# Patient Record
Sex: FEMALE | Race: WHITE | NOT HISPANIC OR LATINO | Employment: OTHER | URBAN - METROPOLITAN AREA
[De-identification: names, ages, dates, MRNs, and addresses within clinical notes are randomized per-mention and may not be internally consistent; named-entity substitution may affect disease eponyms.]

---

## 2019-05-09 PROCEDURE — 88321 CONSLTJ&REPRT SLD PREP ELSWR: CPT | Performed by: SURGERY

## 2019-06-19 ENCOUNTER — DOCUMENTATION (OUTPATIENT)
Dept: ONCOLOGY | Facility: CLINIC | Age: 69
End: 2019-06-19

## 2019-06-21 ENCOUNTER — LAB REQUISITION (OUTPATIENT)
Dept: LAB | Facility: HOSPITAL | Age: 69
End: 2019-06-21

## 2019-06-21 DIAGNOSIS — D05.82 OTHER SPECIFIED TYPE OF CARCINOMA IN SITU OF LEFT BREAST: ICD-10-CM

## 2019-06-26 ENCOUNTER — TRANSCRIBE ORDERS (OUTPATIENT)
Dept: ADMINISTRATIVE | Facility: HOSPITAL | Age: 69
End: 2019-06-26

## 2019-06-26 DIAGNOSIS — C50.412 MALIGNANT NEOPLASM OF UPPER-OUTER QUADRANT OF LEFT FEMALE BREAST, UNSPECIFIED ESTROGEN RECEPTOR STATUS (HCC): Primary | ICD-10-CM

## 2019-06-28 LAB
CYTO UR: NORMAL
LAB AP CASE REPORT: NORMAL
PATH REPORT.FINAL DX SPEC: NORMAL
PATH REPORT.GROSS SPEC: NORMAL

## 2019-07-01 ENCOUNTER — APPOINTMENT (OUTPATIENT)
Dept: PREADMISSION TESTING | Facility: HOSPITAL | Age: 69
End: 2019-07-01

## 2019-07-01 VITALS — WEIGHT: 133.38 LBS | HEIGHT: 61 IN | BODY MASS INDEX: 25.18 KG/M2

## 2019-07-01 LAB
ALBUMIN SERPL-MCNC: 4.6 G/DL (ref 3.5–5.2)
ALBUMIN/GLOB SERPL: 1.8 G/DL
ALP SERPL-CCNC: 67 U/L (ref 39–117)
ALT SERPL W P-5'-P-CCNC: 10 U/L (ref 1–33)
ANION GAP SERPL CALCULATED.3IONS-SCNC: 14 MMOL/L (ref 5–15)
ARTERIAL PATENCY WRIST A: ABNORMAL
AST SERPL-CCNC: 14 U/L (ref 1–32)
ATMOSPHERIC PRESS: ABNORMAL MMHG
BASE EXCESS BLDA CALC-SCNC: 2.8 MMOL/L (ref 0–2)
BDY SITE: ABNORMAL
BILIRUB SERPL-MCNC: 0.4 MG/DL (ref 0.2–1.2)
BODY TEMPERATURE: 37 C
BUN BLD-MCNC: 7 MG/DL (ref 8–23)
BUN/CREAT SERPL: 8.6 (ref 7–25)
CALCIUM SPEC-SCNC: 9.3 MG/DL (ref 8.6–10.5)
CHLORIDE SERPL-SCNC: 103 MMOL/L (ref 98–107)
CO2 BLDA-SCNC: 27.4 MMOL/L (ref 23–27)
CO2 SERPL-SCNC: 27 MMOL/L (ref 22–29)
COHGB MFR BLD: 3.1 % (ref 0–2)
CREAT BLD-MCNC: 0.81 MG/DL (ref 0.57–1)
DEPRECATED RDW RBC AUTO: 49.5 FL (ref 37–54)
EPAP: 0
ERYTHROCYTE [DISTWIDTH] IN BLOOD BY AUTOMATED COUNT: 14.5 % (ref 12.3–15.4)
GFR SERPL CREATININE-BSD FRML MDRD: 70 ML/MIN/1.73
GLOBULIN UR ELPH-MCNC: 2.6 GM/DL
GLUCOSE BLD-MCNC: 213 MG/DL (ref 65–99)
HCO3 BLDA-SCNC: 26.3 MMOL/L (ref 20–26)
HCT VFR BLD AUTO: 45.4 % (ref 34–46.6)
HCT VFR BLD CALC: 44.5 %
HGB BLD-MCNC: 14.5 G/DL (ref 12–15.9)
HGB BLDA-MCNC: 14.5 G/DL (ref 14–18)
HOROWITZ INDEX BLD+IHG-RTO: 21 %
IPAP: 0
MCH RBC QN AUTO: 29.6 PG (ref 26.6–33)
MCHC RBC AUTO-ENTMCNC: 31.9 G/DL (ref 31.5–35.7)
MCV RBC AUTO: 92.7 FL (ref 79–97)
METHGB BLD QL: 0.8 % (ref 0–1.5)
MODALITY: ABNORMAL
NOTE: ABNORMAL
OXYHGB MFR BLDV: 92.4 % (ref 94–99)
PAW @ PEAK INSP FLOW SETTING VENT: 0 CMH2O
PCO2 BLDA: 35.9 MM HG (ref 35–45)
PCO2 TEMP ADJ BLD: 35.9 MM HG (ref 35–45)
PH BLDA: 7.47 PH UNITS (ref 7.35–7.45)
PH, TEMP CORRECTED: 7.47 PH UNITS
PLATELET # BLD AUTO: 195 10*3/MM3 (ref 140–450)
PMV BLD AUTO: 12.2 FL (ref 6–12)
PO2 BLDA: 73.2 MM HG (ref 83–108)
PO2 TEMP ADJ BLD: 73.2 MM HG (ref 83–108)
POTASSIUM BLD-SCNC: 4 MMOL/L (ref 3.5–5.2)
PROT SERPL-MCNC: 7.2 G/DL (ref 6–8.5)
RBC # BLD AUTO: 4.9 10*6/MM3 (ref 3.77–5.28)
SODIUM BLD-SCNC: 144 MMOL/L (ref 136–145)
TOTAL RATE: 0 BREATHS/MINUTE
WBC NRBC COR # BLD: 6.28 10*3/MM3 (ref 3.4–10.8)

## 2019-07-01 PROCEDURE — 80053 COMPREHEN METABOLIC PANEL: CPT | Performed by: SURGERY

## 2019-07-01 PROCEDURE — 82805 BLOOD GASES W/O2 SATURATION: CPT | Performed by: SURGERY

## 2019-07-01 PROCEDURE — 36415 COLL VENOUS BLD VENIPUNCTURE: CPT

## 2019-07-01 PROCEDURE — 93010 ELECTROCARDIOGRAM REPORT: CPT | Performed by: INTERNAL MEDICINE

## 2019-07-01 PROCEDURE — 85027 COMPLETE CBC AUTOMATED: CPT | Performed by: SURGERY

## 2019-07-01 PROCEDURE — 36600 WITHDRAWAL OF ARTERIAL BLOOD: CPT | Performed by: SURGERY

## 2019-07-01 PROCEDURE — 93005 ELECTROCARDIOGRAM TRACING: CPT

## 2019-07-01 RX ORDER — OXYMETAZOLINE HYDROCHLORIDE 0.05 G/100ML
2 SPRAY NASAL AS NEEDED
COMMUNITY

## 2019-07-01 NOTE — PAT
Patient to apply Chlorhexadine wipes  to surgical area (as instructed) the night before procedure and the AM of procedure. Wipes provided.    Patient instructed to drink 20 ounces (or until full) of Gatorade or 20 ounces of G2 (if diabetic) and complete 1 hour before arrival time for procedure (NO RED Gatorade or G2)    Patient verbalized understanding.    Per Anesthesia Request, patient instructed not to take their ACE/ARB medications on the AM of surgery.

## 2019-07-02 ENCOUNTER — HOSPITAL ENCOUNTER (OUTPATIENT)
Dept: NUCLEAR MEDICINE | Facility: HOSPITAL | Age: 69
Discharge: HOME | End: 2019-07-02

## 2019-07-02 ENCOUNTER — ANESTHESIA (OUTPATIENT)
Dept: PERIOP | Facility: HOSPITAL | Age: 69
End: 2019-07-02

## 2019-07-02 ENCOUNTER — ANESTHESIA EVENT (OUTPATIENT)
Dept: PERIOP | Facility: HOSPITAL | Age: 69
End: 2019-07-02

## 2019-07-02 ENCOUNTER — HOSPITAL ENCOUNTER (OUTPATIENT)
Facility: HOSPITAL | Age: 69
Discharge: HOME | End: 2019-07-03
Attending: SURGERY | Admitting: SURGERY

## 2019-07-02 DIAGNOSIS — C50.412 MALIGNANT NEOPLASM OF UPPER-OUTER QUADRANT OF LEFT FEMALE BREAST (HCC): ICD-10-CM

## 2019-07-02 DIAGNOSIS — C50.412 MALIGNANT NEOPLASM OF UPPER-OUTER QUADRANT OF LEFT FEMALE BREAST, UNSPECIFIED ESTROGEN RECEPTOR STATUS (HCC): ICD-10-CM

## 2019-07-02 PROCEDURE — 25010000003 CEFAZOLIN IN DEXTROSE 2-4 GM/100ML-% SOLUTION: Performed by: SURGERY

## 2019-07-02 PROCEDURE — 38792 RA TRACER ID OF SENTINL NODE: CPT

## 2019-07-02 PROCEDURE — 63710000001 CLONAZEPAM 1 MG TABLET: Performed by: SURGERY

## 2019-07-02 PROCEDURE — A9270 NON-COVERED ITEM OR SERVICE: HCPCS | Performed by: SURGERY

## 2019-07-02 PROCEDURE — 88342 IMHCHEM/IMCYTCHM 1ST ANTB: CPT | Performed by: SURGERY

## 2019-07-02 PROCEDURE — 63710000001 ATORVASTATIN 10 MG TABLET: Performed by: SURGERY

## 2019-07-02 PROCEDURE — 88307 TISSUE EXAM BY PATHOLOGIST: CPT | Performed by: SURGERY

## 2019-07-02 PROCEDURE — 88360 TUMOR IMMUNOHISTOCHEM/MANUAL: CPT | Performed by: SURGERY

## 2019-07-02 PROCEDURE — 63710000001 CLONAZEPAM 0.5 MG TABLET: Performed by: SURGERY

## 2019-07-02 PROCEDURE — 88331 PATH CONSLTJ SURG 1 BLK 1SPC: CPT | Performed by: PATHOLOGY

## 2019-07-02 PROCEDURE — 25010000002 PROPOFOL 10 MG/ML EMULSION: Performed by: NURSE ANESTHETIST, CERTIFIED REGISTERED

## 2019-07-02 PROCEDURE — 0 TECHNETIUM FILTERED SULFUR COLLOID: Performed by: SURGERY

## 2019-07-02 PROCEDURE — 25010000002 DEXAMETHASONE PER 1 MG: Performed by: NURSE ANESTHETIST, CERTIFIED REGISTERED

## 2019-07-02 PROCEDURE — 88341 IMHCHEM/IMCYTCHM EA ADD ANTB: CPT | Performed by: SURGERY

## 2019-07-02 PROCEDURE — 63710000001 ACETAMINOPHEN 500 MG TABLET: Performed by: SURGERY

## 2019-07-02 PROCEDURE — 25010000003 LIDOCAINE 1 % SOLUTION: Performed by: NURSE ANESTHETIST, CERTIFIED REGISTERED

## 2019-07-02 PROCEDURE — 25010000002 FENTANYL CITRATE (PF) 100 MCG/2ML SOLUTION: Performed by: NURSE ANESTHETIST, CERTIFIED REGISTERED

## 2019-07-02 PROCEDURE — 25010000002 ONDANSETRON PER 1 MG: Performed by: NURSE ANESTHETIST, CERTIFIED REGISTERED

## 2019-07-02 PROCEDURE — 25010000002 PHENYLEPHRINE PER 1 ML: Performed by: NURSE ANESTHETIST, CERTIFIED REGISTERED

## 2019-07-02 PROCEDURE — 63710000001 OXYCODONE 5 MG TABLET: Performed by: SURGERY

## 2019-07-02 PROCEDURE — 25010000002 DEXAMETHASONE SODIUM PHOSPHATE 10 MG/ML SOLUTION 1 ML VIAL: Performed by: NURSE ANESTHETIST, CERTIFIED REGISTERED

## 2019-07-02 PROCEDURE — 25010000002 HYDROMORPHONE PER 4 MG: Performed by: NURSE ANESTHETIST, CERTIFIED REGISTERED

## 2019-07-02 PROCEDURE — 63710000001 AMLODIPINE 5 MG TABLET: Performed by: SURGERY

## 2019-07-02 PROCEDURE — A9541 TC99M SULFUR COLLOID: HCPCS | Performed by: SURGERY

## 2019-07-02 RX ORDER — PROMETHAZINE HYDROCHLORIDE 25 MG/ML
6.25 INJECTION, SOLUTION INTRAMUSCULAR; INTRAVENOUS ONCE AS NEEDED
Status: DISCONTINUED | OUTPATIENT
Start: 2019-07-02 | End: 2019-07-02 | Stop reason: HOSPADM

## 2019-07-02 RX ORDER — NICOTINE 21 MG/24HR
1 PATCH, TRANSDERMAL 24 HOURS TRANSDERMAL
Status: DISCONTINUED | OUTPATIENT
Start: 2019-07-02 | End: 2019-07-03 | Stop reason: HOSPADM

## 2019-07-02 RX ORDER — FAMOTIDINE 20 MG/1
20 TABLET, FILM COATED ORAL ONCE
Status: COMPLETED | OUTPATIENT
Start: 2019-07-02 | End: 2019-07-02

## 2019-07-02 RX ORDER — SODIUM CHLORIDE 0.9 % (FLUSH) 0.9 %
3 SYRINGE (ML) INJECTION EVERY 12 HOURS SCHEDULED
Status: CANCELLED | OUTPATIENT
Start: 2019-07-02

## 2019-07-02 RX ORDER — PROPOFOL 10 MG/ML
VIAL (ML) INTRAVENOUS AS NEEDED
Status: DISCONTINUED | OUTPATIENT
Start: 2019-07-02 | End: 2019-07-02 | Stop reason: SURG

## 2019-07-02 RX ORDER — PROMETHAZINE HYDROCHLORIDE 25 MG/1
25 SUPPOSITORY RECTAL ONCE AS NEEDED
Status: DISCONTINUED | OUTPATIENT
Start: 2019-07-02 | End: 2019-07-02 | Stop reason: HOSPADM

## 2019-07-02 RX ORDER — FAMOTIDINE 10 MG/ML
20 INJECTION, SOLUTION INTRAVENOUS ONCE
Status: CANCELLED | OUTPATIENT
Start: 2019-07-02 | End: 2019-07-02

## 2019-07-02 RX ORDER — FENTANYL CITRATE 50 UG/ML
50 INJECTION, SOLUTION INTRAMUSCULAR; INTRAVENOUS
Status: DISCONTINUED | OUTPATIENT
Start: 2019-07-02 | End: 2019-07-02 | Stop reason: HOSPADM

## 2019-07-02 RX ORDER — CEFAZOLIN SODIUM 2 G/100ML
2 INJECTION, SOLUTION INTRAVENOUS EVERY 8 HOURS
Status: COMPLETED | OUTPATIENT
Start: 2019-07-02 | End: 2019-07-03

## 2019-07-02 RX ORDER — LIDOCAINE HYDROCHLORIDE 10 MG/ML
0.5 INJECTION, SOLUTION EPIDURAL; INFILTRATION; INTRACAUDAL; PERINEURAL ONCE AS NEEDED
Status: COMPLETED | OUTPATIENT
Start: 2019-07-02 | End: 2019-07-02

## 2019-07-02 RX ORDER — FENTANYL CITRATE 50 UG/ML
INJECTION, SOLUTION INTRAMUSCULAR; INTRAVENOUS AS NEEDED
Status: DISCONTINUED | OUTPATIENT
Start: 2019-07-02 | End: 2019-07-02 | Stop reason: SURG

## 2019-07-02 RX ORDER — GLYCOPYRROLATE 0.2 MG/ML
INJECTION INTRAMUSCULAR; INTRAVENOUS AS NEEDED
Status: DISCONTINUED | OUTPATIENT
Start: 2019-07-02 | End: 2019-07-02 | Stop reason: SURG

## 2019-07-02 RX ORDER — DIPHENHYDRAMINE HYDROCHLORIDE 50 MG/ML
12.5 INJECTION INTRAMUSCULAR; INTRAVENOUS EVERY 6 HOURS PRN
Status: DISCONTINUED | OUTPATIENT
Start: 2019-07-02 | End: 2019-07-03 | Stop reason: HOSPADM

## 2019-07-02 RX ORDER — IBUPROFEN 400 MG/1
400 TABLET ORAL EVERY 6 HOURS PRN
Status: DISCONTINUED | OUTPATIENT
Start: 2019-07-02 | End: 2019-07-03 | Stop reason: HOSPADM

## 2019-07-02 RX ORDER — ENALAPRIL MALEATE 5 MG/1
5 TABLET ORAL
Status: DISCONTINUED | OUTPATIENT
Start: 2019-07-03 | End: 2019-07-03 | Stop reason: HOSPADM

## 2019-07-02 RX ORDER — NEOSTIGMINE METHYLSULFATE 5 MG/5 ML
SYRINGE (ML) INTRAVENOUS AS NEEDED
Status: DISCONTINUED | OUTPATIENT
Start: 2019-07-02 | End: 2019-07-02 | Stop reason: SURG

## 2019-07-02 RX ORDER — HYDROMORPHONE HYDROCHLORIDE 1 MG/ML
0.5 INJECTION, SOLUTION INTRAMUSCULAR; INTRAVENOUS; SUBCUTANEOUS
Status: DISCONTINUED | OUTPATIENT
Start: 2019-07-02 | End: 2019-07-02 | Stop reason: HOSPADM

## 2019-07-02 RX ORDER — PREGABALIN 75 MG/1
75 CAPSULE ORAL ONCE
Status: COMPLETED | OUTPATIENT
Start: 2019-07-02 | End: 2019-07-02

## 2019-07-02 RX ORDER — SODIUM CHLORIDE 0.9 % (FLUSH) 0.9 %
3-10 SYRINGE (ML) INJECTION AS NEEDED
Status: CANCELLED | OUTPATIENT
Start: 2019-07-02

## 2019-07-02 RX ORDER — LIDOCAINE HYDROCHLORIDE 10 MG/ML
INJECTION, SOLUTION INFILTRATION; PERINEURAL AS NEEDED
Status: DISCONTINUED | OUTPATIENT
Start: 2019-07-02 | End: 2019-07-02 | Stop reason: SURG

## 2019-07-02 RX ORDER — ATORVASTATIN CALCIUM 10 MG/1
10 TABLET, FILM COATED ORAL NIGHTLY
Status: DISCONTINUED | OUTPATIENT
Start: 2019-07-02 | End: 2019-07-03 | Stop reason: HOSPADM

## 2019-07-02 RX ORDER — ONDANSETRON 2 MG/ML
4 INJECTION INTRAMUSCULAR; INTRAVENOUS EVERY 6 HOURS PRN
Status: DISCONTINUED | OUTPATIENT
Start: 2019-07-02 | End: 2019-07-03 | Stop reason: HOSPADM

## 2019-07-02 RX ORDER — MAGNESIUM HYDROXIDE 1200 MG/15ML
LIQUID ORAL AS NEEDED
Status: DISCONTINUED | OUTPATIENT
Start: 2019-07-02 | End: 2019-07-02 | Stop reason: HOSPADM

## 2019-07-02 RX ORDER — AMLODIPINE BESYLATE 5 MG/1
5 TABLET ORAL DAILY
Status: DISCONTINUED | OUTPATIENT
Start: 2019-07-02 | End: 2019-07-03 | Stop reason: HOSPADM

## 2019-07-02 RX ORDER — PROMETHAZINE HYDROCHLORIDE 25 MG/1
25 TABLET ORAL ONCE AS NEEDED
Status: DISCONTINUED | OUTPATIENT
Start: 2019-07-02 | End: 2019-07-02 | Stop reason: HOSPADM

## 2019-07-02 RX ORDER — PROPOFOL 10 MG/ML
VIAL (ML) INTRAVENOUS CONTINUOUS PRN
Status: DISCONTINUED | OUTPATIENT
Start: 2019-07-02 | End: 2019-07-02 | Stop reason: SURG

## 2019-07-02 RX ORDER — SODIUM CHLORIDE, SODIUM LACTATE, POTASSIUM CHLORIDE, CALCIUM CHLORIDE 600; 310; 30; 20 MG/100ML; MG/100ML; MG/100ML; MG/100ML
9 INJECTION, SOLUTION INTRAVENOUS CONTINUOUS
Status: DISCONTINUED | OUTPATIENT
Start: 2019-07-02 | End: 2019-07-03 | Stop reason: HOSPADM

## 2019-07-02 RX ORDER — SODIUM CHLORIDE 9 MG/ML
50 INJECTION, SOLUTION INTRAVENOUS CONTINUOUS
Status: DISCONTINUED | OUTPATIENT
Start: 2019-07-02 | End: 2019-07-03 | Stop reason: HOSPADM

## 2019-07-02 RX ORDER — ACETAMINOPHEN 500 MG
1000 TABLET ORAL EVERY 6 HOURS
Status: DISCONTINUED | OUTPATIENT
Start: 2019-07-02 | End: 2019-07-03 | Stop reason: HOSPADM

## 2019-07-02 RX ORDER — ONDANSETRON 2 MG/ML
4 INJECTION INTRAMUSCULAR; INTRAVENOUS ONCE AS NEEDED
Status: DISCONTINUED | OUTPATIENT
Start: 2019-07-02 | End: 2019-07-02 | Stop reason: HOSPADM

## 2019-07-02 RX ORDER — ATRACURIUM BESYLATE 10 MG/ML
INJECTION, SOLUTION INTRAVENOUS AS NEEDED
Status: DISCONTINUED | OUTPATIENT
Start: 2019-07-02 | End: 2019-07-02 | Stop reason: SURG

## 2019-07-02 RX ORDER — DEXAMETHASONE SODIUM PHOSPHATE 4 MG/ML
INJECTION, SOLUTION INTRA-ARTICULAR; INTRALESIONAL; INTRAMUSCULAR; INTRAVENOUS; SOFT TISSUE AS NEEDED
Status: DISCONTINUED | OUTPATIENT
Start: 2019-07-02 | End: 2019-07-02 | Stop reason: SURG

## 2019-07-02 RX ORDER — ENALAPRILAT 2.5 MG/2ML
1.25 INJECTION INTRAVENOUS EVERY 6 HOURS PRN
Status: DISCONTINUED | OUTPATIENT
Start: 2019-07-02 | End: 2019-07-03 | Stop reason: HOSPADM

## 2019-07-02 RX ORDER — CEFAZOLIN SODIUM 2 G/100ML
2 INJECTION, SOLUTION INTRAVENOUS ONCE
Status: COMPLETED | OUTPATIENT
Start: 2019-07-02 | End: 2019-07-02

## 2019-07-02 RX ORDER — ONDANSETRON 2 MG/ML
INJECTION INTRAMUSCULAR; INTRAVENOUS AS NEEDED
Status: DISCONTINUED | OUTPATIENT
Start: 2019-07-02 | End: 2019-07-02 | Stop reason: SURG

## 2019-07-02 RX ORDER — MELOXICAM 15 MG/1
15 TABLET ORAL ONCE
Status: COMPLETED | OUTPATIENT
Start: 2019-07-02 | End: 2019-07-02

## 2019-07-02 RX ORDER — PROMETHAZINE HYDROCHLORIDE 25 MG/ML
6.25 INJECTION, SOLUTION INTRAMUSCULAR; INTRAVENOUS EVERY 6 HOURS PRN
Status: DISCONTINUED | OUTPATIENT
Start: 2019-07-02 | End: 2019-07-03 | Stop reason: HOSPADM

## 2019-07-02 RX ORDER — NALOXONE HCL 0.4 MG/ML
0.1 VIAL (ML) INJECTION
Status: DISCONTINUED | OUTPATIENT
Start: 2019-07-02 | End: 2019-07-03 | Stop reason: HOSPADM

## 2019-07-02 RX ORDER — HYDROMORPHONE HYDROCHLORIDE 1 MG/ML
0.3 INJECTION, SOLUTION INTRAMUSCULAR; INTRAVENOUS; SUBCUTANEOUS
Status: DISCONTINUED | OUTPATIENT
Start: 2019-07-02 | End: 2019-07-03 | Stop reason: HOSPADM

## 2019-07-02 RX ORDER — DIAZEPAM 2 MG/1
2 TABLET ORAL EVERY 8 HOURS PRN
Status: DISCONTINUED | OUTPATIENT
Start: 2019-07-02 | End: 2019-07-03 | Stop reason: HOSPADM

## 2019-07-02 RX ORDER — OXYCODONE HYDROCHLORIDE 5 MG/1
5 TABLET ORAL EVERY 4 HOURS PRN
Status: DISCONTINUED | OUTPATIENT
Start: 2019-07-02 | End: 2019-07-03 | Stop reason: HOSPADM

## 2019-07-02 RX ORDER — ACETAMINOPHEN 500 MG
1000 TABLET ORAL ONCE
Status: COMPLETED | OUTPATIENT
Start: 2019-07-02 | End: 2019-07-02

## 2019-07-02 RX ADMIN — LIDOCAINE HYDROCHLORIDE 0.2 ML: 10 INJECTION, SOLUTION EPIDURAL; INFILTRATION; INTRACAUDAL; PERINEURAL at 08:00

## 2019-07-02 RX ADMIN — SODIUM CHLORIDE, POTASSIUM CHLORIDE, SODIUM LACTATE AND CALCIUM CHLORIDE 9 ML/HR: 600; 310; 30; 20 INJECTION, SOLUTION INTRAVENOUS at 08:00

## 2019-07-02 RX ADMIN — GLYCOPYRROLATE 0.4 MG: 0.2 INJECTION, SOLUTION INTRAMUSCULAR; INTRAVENOUS at 11:12

## 2019-07-02 RX ADMIN — ACETAMINOPHEN 1000 MG: 500 TABLET, FILM COATED ORAL at 20:30

## 2019-07-02 RX ADMIN — CEFAZOLIN SODIUM 2 G: 2 INJECTION, SOLUTION INTRAVENOUS at 09:39

## 2019-07-02 RX ADMIN — PREGABALIN 75 MG: 75 CAPSULE ORAL at 08:26

## 2019-07-02 RX ADMIN — PROPOFOL 30 MG: 10 INJECTION, EMULSION INTRAVENOUS at 11:10

## 2019-07-02 RX ADMIN — FENTANYL CITRATE 50 MCG: 50 INJECTION, SOLUTION INTRAMUSCULAR; INTRAVENOUS at 09:43

## 2019-07-02 RX ADMIN — OXYCODONE HYDROCHLORIDE 5 MG: 5 TABLET ORAL at 18:16

## 2019-07-02 RX ADMIN — CLONAZEPAM 2.5 MG: 1 TABLET ORAL at 20:29

## 2019-07-02 RX ADMIN — SODIUM CHLORIDE 50 ML/HR: 9 INJECTION, SOLUTION INTRAVENOUS at 13:32

## 2019-07-02 RX ADMIN — DEXAMETHASONE SODIUM PHOSPHATE 6 MG: 4 INJECTION, SOLUTION INTRAMUSCULAR; INTRAVENOUS at 10:09

## 2019-07-02 RX ADMIN — ATORVASTATIN CALCIUM 10 MG: 10 TABLET, FILM COATED ORAL at 20:30

## 2019-07-02 RX ADMIN — PROPOFOL 25 MCG/KG/MIN: 10 INJECTION, EMULSION INTRAVENOUS at 10:22

## 2019-07-02 RX ADMIN — MELOXICAM 15 MG: 15 TABLET ORAL at 08:25

## 2019-07-02 RX ADMIN — ONDANSETRON 4 MG: 2 INJECTION INTRAMUSCULAR; INTRAVENOUS at 10:54

## 2019-07-02 RX ADMIN — EPHEDRINE SULFATE 5 MG: 50 INJECTION INTRAMUSCULAR; INTRAVENOUS; SUBCUTANEOUS at 10:10

## 2019-07-02 RX ADMIN — PROPOFOL 120 MG: 10 INJECTION, EMULSION INTRAVENOUS at 09:43

## 2019-07-02 RX ADMIN — LIDOCAINE HYDROCHLORIDE 30 MG: 10 INJECTION, SOLUTION INFILTRATION; PERINEURAL at 09:43

## 2019-07-02 RX ADMIN — FENTANYL CITRATE 50 MCG: 50 INJECTION, SOLUTION INTRAMUSCULAR; INTRAVENOUS at 11:28

## 2019-07-02 RX ADMIN — HYDROMORPHONE HYDROCHLORIDE 0.5 MG: 1 INJECTION, SOLUTION INTRAMUSCULAR; INTRAVENOUS; SUBCUTANEOUS at 11:44

## 2019-07-02 RX ADMIN — ACETAMINOPHEN 1000 MG: 500 TABLET ORAL at 08:24

## 2019-07-02 RX ADMIN — ATRACURIUM BESYLATE 30 MG: 10 INJECTION, SOLUTION INTRAVENOUS at 09:44

## 2019-07-02 RX ADMIN — ACETAMINOPHEN 1000 MG: 500 TABLET, FILM COATED ORAL at 14:57

## 2019-07-02 RX ADMIN — Medication 2 MG: at 11:12

## 2019-07-02 RX ADMIN — DEXAMETHASONE SODIUM PHOSPHATE 60 ML: 10 INJECTION INTRAMUSCULAR; INTRAVENOUS at 09:49

## 2019-07-02 RX ADMIN — PROPOFOL 30 MG: 10 INJECTION, EMULSION INTRAVENOUS at 11:05

## 2019-07-02 RX ADMIN — AMLODIPINE BESYLATE 5 MG: 5 TABLET ORAL at 13:43

## 2019-07-02 RX ADMIN — GLYCOPYRROLATE 0.2 MG: 0.2 INJECTION, SOLUTION INTRAMUSCULAR; INTRAVENOUS at 10:18

## 2019-07-02 RX ADMIN — CEFAZOLIN SODIUM 2 G: 2 INJECTION, SOLUTION INTRAVENOUS at 16:24

## 2019-07-02 RX ADMIN — FAMOTIDINE 20 MG: 20 TABLET ORAL at 08:25

## 2019-07-02 RX ADMIN — PHENYLEPHRINE HYDROCHLORIDE 100 MCG: 10 INJECTION INTRAVENOUS at 10:46

## 2019-07-02 RX ADMIN — TECHNETIUM TC 99M SULFUR COLLOID 1 DOSE: KIT at 09:50

## 2019-07-02 RX ADMIN — EPHEDRINE SULFATE 10 MG: 50 INJECTION INTRAMUSCULAR; INTRAVENOUS; SUBCUTANEOUS at 10:17

## 2019-07-02 NOTE — ANESTHESIA POSTPROCEDURE EVALUATION
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)    Surgeon:  Andrew Peters MD Provider:  Ori Murrell MD    Anesthesia Type:  general ASA Status:  3          Anesthesia Type: general  Last vitals  BP   148/84 (07/02/19 0818)   Temp   98.1 °F (36.7 °C) (07/02/19 0818)   Pulse   82 (07/02/19 0818)   Resp   18 (07/02/19 0818)     SpO2   96 % (07/02/19 0818)     Post Anesthesia Care and Evaluation    Patient location during evaluation: PACU  Patient participation: complete - patient participated  Level of consciousness: awake  Pain score: 0  Pain management: adequate  Airway patency: patent  Anesthetic complications: No anesthetic complications  PONV Status: none  Cardiovascular status: hemodynamically stable and acceptable  Respiratory status: nonlabored ventilation, acceptable and nasal cannula  Hydration status: acceptable

## 2019-07-02 NOTE — ANESTHESIA PROCEDURE NOTES
Peripheral Block      Patient location during procedure: OR  Reason for block: at surgeon's request and post-op pain management  Performed by  Anesthesiologist: Ori Murrell MD  Assisted by: Nahun Moffett CRNA  Preanesthetic Checklist  Completed: patient identified, site marked, surgical consent, pre-op evaluation, timeout performed, IV checked, risks and benefits discussed and monitors and equipment checked  Prep:  Sterile barriers:cap, gloves, gown and mask  Prep: ChloraPrep  Patient monitoring: blood pressure monitoring, continuous pulse oximetry and EKG  Procedure    Guidance:ultrasound guided and landmark technique  Images:still images obtained    Laterality:Bilateral  Block Type:PECS I and PECS II  Injection Technique:single-shot  Needle Type:short-bevel  Needle Gauge:20 G            Medications  Preservative Free Saline:10ml  Comment:Block Injection:  Total volume of LA divided between Right and Left sided blocks       Adjuncts:   Buprenorphine 0.30 mg ,Decadron 4 mg PSF    Post Assessment  Injection Assessment: negative aspiration for heme and incremental injection  Patient Tolerance:comfortable throughout block  Complications:no  Additional Notes  The pt. Was placed in the Supine Position and GA was induced     The insertion site was prepped with CHG and Ultrasound guidance with In-Plane techniquewas  a 4inch BBraun 360 degree echogenic needle was visualized.  Normal Saline PSF was  utilized for hydrodissection of tissue. PECS 1 Block- Pectoralis Major and Minor where identified and LA was injected between PMM and PmM at the level of the 3rd Rib(10ml),  PECS 2-  Pectoralis Minor and Serratus muscle where identified and the needle was advanced laterally in-plane with the 4th rib as a backstop, pleura was monitored.  LA was injected between SA and PmM at the level of 4th rib( 20ml).  LA injection spread was visualized, injection was incremental 1-5ml, normal or low injection pressure, no  intravascular injection, no pneumothorax appreciated.  Thank You.

## 2019-07-02 NOTE — BRIEF OP NOTE
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      A Breast, Right Tissue · TISSUE PATHOLOGY EXAM   Andrew Peters MD 7/2/19 1021 No     Description: suture is lateral     B Breast, Left Tissue · TISSUE PATHOLOGY EXAM   Andrew Peters MD 7/2/19 1051 No     Description: sauture is lateral    C Strat[place] Lymph Node Tissue · TISSUE PATHOLOGY EXAM   Andrew Peters MD 7/2/19 1040 Yes     Description: left sentinel node                 Drains:   Closed/Suction Drain 1 Right Breast 15 Fr. (Active)       Closed/Suction Drain 1 Left Breast 15 Fr. (Active)       Findings: Left sentinel lymph node negative for metastases    Complications: None      Andrew Peters MD     Date: 7/2/2019  Time: 11:19 AM

## 2019-07-02 NOTE — PLAN OF CARE
Problem: Breast Surgery/Reconstruction (Adult)  Goal: Anesthesia/Sedation Recovery  Outcome: Ongoing (interventions implemented as appropriate)

## 2019-07-02 NOTE — ANESTHESIA PROCEDURE NOTES
Airway  Urgency: elective    Airway not difficult    General Information and Staff    Patient location during procedure: OR  CRNA: Maureen Dueñas CRNA    Indications and Patient Condition  Indications for airway management: airway protection    Preoxygenated: yes  MILS not maintained throughout  Mask difficulty assessment: 1 - vent by mask    Final Airway Details  Final airway type: endotracheal airway      Successful airway: ETT  Cuffed: yes   Successful intubation technique: direct laryngoscopy  Facilitating devices/methods: Bougie  Endotracheal tube insertion site: oral  Blade: Morena  Blade size: 3  ETT size (mm): 7.0  Cormack-Lehane Classification: grade IIb - view of arytenoids or posterior of glottis only  Placement verified by: chest auscultation and capnometry   Measured from: lips  ETT to lips (cm): 20  Number of attempts at approach: 1    Additional Comments  Negative epigastric sounds, Breath sound equal bilaterally with symmetric chest rise and fall

## 2019-07-02 NOTE — OP NOTE
Operative Note    Date of Surgery:  7/2/2019    Pre-Operative Diagnosis: Left breast cancer in the upper outer quadrant    Post-Operative Diagnosis: Left breast cancer in the upper outer quadrant    Procedure: Left sentinel lymph node injection                       Left simple mastectomy                       Left deep subfascial sentinel lymph node biopsy                       Prophylactic right simple mastectomy    Anesthesia: General    Surgeon:  Andrew Peters MD    Assistant: NEWTON Mandel    Estimated Blood Loss: Very minimal    Findings: Left sentinel lymph node negative for metastases    Complications: None      Indication for Procedure: Mrs. Cruz is a pleasant 69 years old lady who presented with a palpable mass in the left breast in the upper outer quadrant with work-up remarkable for invasive ductal carcinoma.  She opted to proceed with the above procedure with no plan to have delayed reconstruction.    Procedure: Patient was taken to the operating by anesthesia and placed supine on the table.  Following induction of general endotracheal anesthesia, she received 2 g of Kefzol IV.  SCDs were placed.  Anesthesia placed ultrasound-guided pectoralis nerve blocks bilaterally.  537 mCi of radioactive technetium was injected in the left subareolar region following which the breasts, chest, axilla and upper arms were prepped and draped in a sterile fashion.  Timeout was observed.  We proceeded with the right prophylactic simple mastectomy first which was done via a transverse elliptical incision encompassing the areola nipple complex.  A superior than inferior flaps were raised dissecting the breast tissue away from the flaps down to where the muscle was exposed.  The breast was then removed off the underlying pectoris major muscle taking the fascia along with the specimen.  Some of the larger vessels of the breast were ligated with 3-0 silk suture.  The breast was marked with a silk suture  laterally, weighed and sent fresh to pathology.  The wound was then irrigated with water with clear return fluid noted.  We then proceeded with the left simple mastectomy which was done in a similar fashion.  Once in the axilla we identified a deep subfascial sentinel lymph node that had a very high radioactive count.  This was excised and sent to pathology for frozen section.  It was noted to be negative for metastases.  No other radioactivity or palpable nodes were appreciated in the axilla.  The breast was also removed off of the underlying pectoralis major muscle.  Laterally we appreciated the tumor which was adhesed to the muscle.  The muscle was excised along with the breast.  The breast was marked with a long silk suture laterally, weighed and sent fresh to pathology.  The wound was then irrigated with water with clear return fluid noted.  15 Faroese round Herve-Allan drains were placed on either side percutaneously and anchored to the skin with 2-0 silk suture.  The subcutaneous tissues were approximated with interrupted 3-0 Vicryl sutures and the skin incisions were approximated with staples.  Dual Kelsie was applied.  The patient tolerated the procedure well with no complications.  She was extubated and taken to the recovery room in stable condition.  Sponge count and needle count were correct at the end of the procedure    Andrew Peters MD  07/02/19  3:33 PM

## 2019-07-02 NOTE — ANESTHESIA PREPROCEDURE EVALUATION
Anesthesia Evaluation     Patient summary reviewed and Nursing notes reviewed   no history of anesthetic complications:  NPO Solid Status: > 8 hours  NPO Liquid Status: > 8 hours           Airway   Mallampati: II  TM distance: >3 FB  Neck ROM: full  No difficulty expected  Dental      Pulmonary - negative pulmonary ROS and normal exam   Cardiovascular - normal exam    (+) hypertension, hyperlipidemia,       Neuro/Psych  (+) psychiatric history,     GI/Hepatic/Renal/Endo    (+)   diabetes mellitus,     Musculoskeletal     (+) back pain,   Abdominal    Substance History      OB/GYN          Other   (+) arthritis                   Anesthesia Plan    ASA 3     general   (Pecs)  intravenous induction   Anesthetic plan, all risks, benefits, and alternatives have been provided, discussed and informed consent has been obtained with: patient.    Plan discussed with CRNA.

## 2019-07-03 VITALS
HEART RATE: 76 BPM | HEIGHT: 61 IN | RESPIRATION RATE: 18 BRPM | BODY MASS INDEX: 25.18 KG/M2 | TEMPERATURE: 98.5 F | SYSTOLIC BLOOD PRESSURE: 161 MMHG | WEIGHT: 133.38 LBS | DIASTOLIC BLOOD PRESSURE: 74 MMHG | OXYGEN SATURATION: 100 %

## 2019-07-03 LAB — GLUCOSE BLDC GLUCOMTR-MCNC: 189 MG/DL (ref 70–130)

## 2019-07-03 PROCEDURE — 63710000001 ACETAMINOPHEN 500 MG TABLET: Performed by: SURGERY

## 2019-07-03 PROCEDURE — 63710000001 ENALAPRIL 5 MG TABLET: Performed by: SURGERY

## 2019-07-03 PROCEDURE — A9270 NON-COVERED ITEM OR SERVICE: HCPCS | Performed by: SURGERY

## 2019-07-03 PROCEDURE — 82962 GLUCOSE BLOOD TEST: CPT

## 2019-07-03 PROCEDURE — 25010000003 CEFAZOLIN IN DEXTROSE 2-4 GM/100ML-% SOLUTION: Performed by: SURGERY

## 2019-07-03 PROCEDURE — 63710000001 AMLODIPINE 5 MG TABLET: Performed by: SURGERY

## 2019-07-03 RX ORDER — OXYCODONE HYDROCHLORIDE 5 MG/1
5 TABLET ORAL EVERY 4 HOURS PRN
Qty: 12 TABLET | Refills: 0 | Status: SHIPPED | OUTPATIENT
Start: 2019-07-03 | End: 2019-07-12

## 2019-07-03 RX ADMIN — ACETAMINOPHEN 1000 MG: 500 TABLET, FILM COATED ORAL at 02:20

## 2019-07-03 RX ADMIN — ACETAMINOPHEN 1000 MG: 500 TABLET, FILM COATED ORAL at 10:27

## 2019-07-03 RX ADMIN — CEFAZOLIN SODIUM 2 G: 2 INJECTION, SOLUTION INTRAVENOUS at 00:38

## 2019-07-03 NOTE — PLAN OF CARE
Problem: Patient Care Overview  Goal: Plan of Care Review  Outcome: Ongoing (interventions implemented as appropriate)   07/03/19 0316   Coping/Psychosocial   Plan of Care Reviewed With patient   Plan of Care Review   Progress improving   OTHER   Outcome Summary pt had no c/o, rested well, should d/c in AM     Goal: Individualization and Mutuality  Outcome: Ongoing (interventions implemented as appropriate)    Goal: Discharge Needs Assessment  Outcome: Ongoing (interventions implemented as appropriate)    Goal: Interprofessional Rounds/Family Conf  Outcome: Ongoing (interventions implemented as appropriate)      Problem: Breast Surgery/Reconstruction (Adult)  Goal: Signs and Symptoms of Listed Potential Problems Will be Absent, Minimized or Managed (Breast Surgery/Reconstruction)  Outcome: Ongoing (interventions implemented as appropriate)    Goal: Anesthesia/Sedation Recovery  Outcome: Ongoing (interventions implemented as appropriate)

## 2019-07-03 NOTE — PROGRESS NOTES
"Cherelle Lara  1950  [id number]    Surgery Progress Note    Date of visit: 7/3/2019    Subjective: Denies mastectomy pain    Objective:    /67   Pulse 66   Temp 99.6 °F (37.6 °C) (Oral)   Resp 18   Ht 154.9 cm (60.98\")   Wt 60.5 kg (133 lb 6.1 oz)   SpO2 93%   Breastfeeding? No   BMI 25.21 kg/m²     Intake/Output Summary (Last 24 hours) at 7/3/2019 0750  Last data filed at 7/3/2019 0500  Gross per 24 hour   Intake 700 ml   Output 470 ml   Net 230 ml       CV: Regular rate and rhythm  L: normal air entry  BREAST: Bilateral mastectomy dressings are intact and dry   Kelsie appliance is intact   Herve-Allan drainage is adequate        LABS:    Results from last 7 days   Lab Units 07/01/19  1718   WBC 10*3/mm3 6.28   HEMOGLOBIN g/dL 14.5   HEMATOCRIT % 45.4   PLATELETS 10*3/mm3 195     Results from last 7 days   Lab Units 07/01/19  1718   SODIUM mmol/L 144   POTASSIUM mmol/L 4.0   CHLORIDE mmol/L 103   CO2 mmol/L 27.0   BUN mg/dL 7*   CREATININE mg/dL 0.81   CALCIUM mg/dL 9.3   BILIRUBIN mg/dL 0.4   ALK PHOS U/L 67   ALT (SGPT) U/L 10   AST (SGOT) U/L 14   GLUCOSE mg/dL 213*     Results from last 7 days   Lab Units 07/01/19  1718   SODIUM mmol/L 144   POTASSIUM mmol/L 4.0   CHLORIDE mmol/L 103   CO2 mmol/L 27.0   BUN mg/dL 7*   CREATININE mg/dL 0.81   GLUCOSE mg/dL 213*   CALCIUM mg/dL 9.3     No results found for: LIPASE      Assessment/ Plan: Stable course status post bilateral mastectomies with left sentinel lymph node biopsy secondary to left breast cancer  Patient is progressing nicely  Instructions were given to her and her    Tylenol 3 times daily for 4 days  Oxycodone as needed  Home today  Follow-up in 1 week at the cancer clinic    Problem List Items Addressed This Visit        Other    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

## 2019-07-03 NOTE — PROGRESS NOTES
Discharge Planning Assessment  [facility]     Patient Name: Cherelle Lara  MRN: [id number]  Today's Date: 7/3/2019    Admit Date: 7/2/2019    Discharge Needs Assessment     Row Name 07/03/19 0932       Living Environment    Lives With  spouse    Current Living Arrangements  home/apartment/condo    Primary Care Provided by  self    Provides Primary Care For  no one    Family Caregiver if Needed  spouse    Quality of Family Relationships  supportive;involved;helpful    Able to Return to Prior Arrangements  yes       Resource/Environmental Concerns    Resource/Environmental Concerns  none    Transportation Concerns  car, none       Transition Planning    Patient/Family Anticipates Transition to  home with family    Patient/Family Anticipated Services at Transition  none    Transportation Anticipated  family or friend will provide       Discharge Needs Assessment    Readmission Within the Last 30 Days  no previous admission in last 30 days    Concerns to be Addressed  no discharge needs identified    Equipment Currently Used at Home  none    Anticipated Changes Related to Illness  other (see comments) post surgical recuperation    Equipment Needed After Discharge  wound care supplies        Discharge Plan     Row Name 07/03/19 0933       Plan    Plan  Home with spouse    Patient/Family in Agreement with Plan  yes    Plan Comments  I spoke with patient this am. No current discharge planning needs identified. She will have her 's assistance for home.     Final Discharge Disposition Code  01 - home or self-care    Row Name 07/03/19 0828       Plan    Final Discharge Disposition Code  01 - home or self-care        Destination      No service coordination in this encounter.      Durable Medical Equipment      No service coordination in this encounter.      Dialysis/Infusion      No service coordination in this encounter.      Home Medical Care      No service coordination in this encounter.      Therapy       No service coordination in this encounter.      Community Resources      No service coordination in this encounter.        Expected Discharge Date and Time     Expected Discharge Date Expected Discharge Time    Jul 3, 2019         Demographic Summary     Row Name 07/03/19 0930       General Information    Referral Source  admission list    Preferred Language  Czech     Used During This Interaction  no spouse did much of the answering of questions    General Information Comments  PCP is Jess De Luna       Contact Information    Permission Granted to Share Info With      Contact Information Comments  [phone]        Functional Status     Row Name 07/03/19 0931       Functional Status    Usual Activity Tolerance  excellent    Current Activity Tolerance  excellent       Functional Status, IADL    Medications  independent    Meal Preparation  independent    Housekeeping  independent    Laundry  independent    Shopping  independent       Employment/    Employment/ Comments  Has Medicare, no medicare supplement. Is aware of copays. I reviewed benefind with patient's spouse and he tells me she would not be eligible.         Psychosocial    No documentation.       Abuse/Neglect    No documentation.       Legal    No documentation.       Substance Abuse    No documentation.       Patient Forms    No documentation.           Eulalia Springer RN

## 2019-07-10 ENCOUNTER — CONSULT (OUTPATIENT)
Dept: ONCOLOGY | Facility: CLINIC | Age: 69
End: 2019-07-10

## 2019-07-10 VITALS
TEMPERATURE: 97.8 F | SYSTOLIC BLOOD PRESSURE: 129 MMHG | BODY MASS INDEX: 25.72 KG/M2 | HEIGHT: 60 IN | WEIGHT: 131 LBS | RESPIRATION RATE: 18 BRPM | HEART RATE: 86 BPM | OXYGEN SATURATION: 99 % | DIASTOLIC BLOOD PRESSURE: 68 MMHG

## 2019-07-10 DIAGNOSIS — C50.412 MALIGNANT NEOPLASM OF UPPER-OUTER QUADRANT OF LEFT BREAST IN FEMALE, ESTROGEN RECEPTOR POSITIVE (HCC): Primary | ICD-10-CM

## 2019-07-10 DIAGNOSIS — Z17.0 MALIGNANT NEOPLASM OF UPPER-OUTER QUADRANT OF LEFT BREAST IN FEMALE, ESTROGEN RECEPTOR POSITIVE (HCC): Primary | ICD-10-CM

## 2019-07-10 PROBLEM — C50.911 MALIGNANT NEOPLASM OF RIGHT BREAST IN FEMALE, ESTROGEN RECEPTOR POSITIVE (HCC): Status: ACTIVE | Noted: 2019-07-10

## 2019-07-10 LAB
CYTO UR: NORMAL
LAB AP CASE REPORT: NORMAL
LAB AP CLINICAL INFORMATION: NORMAL
LAB AP DIAGNOSIS COMMENT: NORMAL
LAB AP SPECIAL STAINS: NORMAL
Lab: NORMAL
PATH REPORT.FINAL DX SPEC: NORMAL
PATH REPORT.GROSS SPEC: NORMAL

## 2019-07-10 PROCEDURE — 99204 OFFICE O/P NEW MOD 45 MIN: CPT | Performed by: INTERNAL MEDICINE

## 2019-07-10 NOTE — PROGRESS NOTES
Subjective     PROBLEM LIST:  1. 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  ki67 30%.  Bilateral mastectomy on 19.   Pathology showed a 3.5 cm grade 2 metaplastic carcinoma, focally invading skeletal muscle.  0/1 SLN involved.  2. [id number] Invasive ductal carcinoma of the right breast, ER+ RI weakly +, Her2 negative   A) Bilateral mastectomy on 19.   Pathology showed a 8 mm intermediate grade IDC  2. Anxiety/depression  3. Hypertension  4. Hyperlipidemia     CHIEF COMPLAINT: breast cancer      HISTORY OF PRESENT ILLNESS:  The patient is a 69 y.o. female, referred for evaluation of a recently diagnosed breast cancer.  She presented with a mass on self-exam.  She noticed this right before a scheduled trip to [place] which is where she is originally from.  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  While she was there she had a mammogram and biopsy which showed a metaplastic carcinoma in the left breast.  She returned home and was referred to Dr. Peters.  She underwent a bilateral mastectomy last week.  She was incidentally found to have a right sided breast cancer which was 8 mm in size.    She says she is feeling more comfortable today after removal of some of the drain tubes.  She still has drains in place.  She is not having significant pain.    She has had 3 sisters with lung cancer and a brother with throat cancer.  She is concerned about chemotherapy based on her family members experiences.  She is currently smoking but trying to quit.    REVIEW OF SYSTEMS:  A 14 point review of systems was performed and is negative except as noted above.    Past Medical History:   Diagnosis Date   • Anxiety    • Arthritis    • Back pain    • Breast cancer (CMS/HCC)    • Hyperlipidemia    • Hypertension        GYN History: .  Menarche at 10,  "1st birth at 15.    Current Outpatient Medications on File Prior to Visit   Medication Sig Dispense Refill   • amLODIPine Besylate (NORVASC PO) Take 5 mg by mouth Daily.     • clonazePAM in ORA-PLUS-ora sweet Take 2.5 mg by mouth Daily.     • ENALAPRIL MALEATE PO Take 1 tablet by mouth Daily.     • oxyCODONE (ROXICODONE) 5 MG immediate release tablet Take 1 tablet by mouth Every 4 (Four) Hours As Needed for Severe Pain  for up to 9 days. 12 tablet 0   • oxymetazoline (AFRIN) 0.05 % nasal spray 2 sprays into the nostril(s) as directed by provider As Needed for Congestion.     • SIMVASTATIN PO Take 10 mg by mouth Daily.       No current facility-administered medications on file prior to visit.        Allergies   Allergen Reactions   • Sulfa Antibiotics Anaphylaxis       Past Surgical History:   Procedure Laterality Date   • CATARACT EXTRACTION Bilateral    • COLONOSCOPY     • HYSTERECTOMY     • LAPAROSCOPIC SALPINGOOPHERECTOMY Left    • MASTECTOMY W/ SENTINEL NODE BIOPSY Bilateral 2019    Procedure: BILATERAL MASTECTOMIES,LEFT SENTINEL NODE BIOPSY;  Surgeon: Andrew Peters MD;  Location: [facility];  Service: General       Social History     Socioeconomic History   • Marital status:      Spouse name: Not on file   • Number of children: Not on file   • Years of education: Not on file   • Highest education level: Not on file   Tobacco Use   • Smoking status: Former Smoker     Packs/day: 1.00     Years: 50.00     Pack years: 50.00     Types: Cigarettes     Last attempt to quit: 2019     Years since quittin.0   • Smokeless tobacco: Never Used   Substance and Sexual Activity   • Alcohol use: No     Frequency: Never   • Drug use: No   • Sexual activity: Defer       History reviewed. No pertinent family history.    Objective     /68 Comment: RUE  Pulse 86   Temp 97.8 °F (36.6 °C) (Temporal)   Resp 18   Ht 152.4 cm (60\")   Wt 59.4 kg (131 lb)   SpO2 99% Comment: RA  BMI 25.58 kg/m² "   Performance Status: 0  General: well appearing female in no acute distress  Neuro: alert and oriented  HEENT: sclerae anicteric, oropharynx clear  Lymphatics: no cervical, supraclavicular, or axillary adenopathy  Cardiovascular: regular rate and rhythm, no murmurs  Lungs: clear to auscultation bilaterally  Abdomen: soft, nontender, nondistended.  No palpable organomegaly  Extremities: no lower extremity edema  Skin: no rashes, lesions, bruising, or petechiae  Psych: mood and affect appropriate            Assessment/Plan     Cherelle Lara is a 69 y.o. year old female with a pT2N0 ER and NJ weakly positive, her2 negative metaplastic carcinoma of the left breast, as well as a T1N0 ER+ Her2 negaitve IDC of the right breast.    Metaplastic carcinoma is thought in some publications to have a higher risk of recurrence, although this data is not consistent.  It is generally treated the same as other types of breast cancer.  I would recommend adjuvant chemotherapy for the left sided breast cancer.  We discussed treatment with Taxotere and cyclophosphamide.  We discussed the side effects of this regimen including alopecia, nausea, fatigue, myelosuppression, infusion reaction, neuropathy, and diarrhea.    After completing chemotherapy she would be a candidate for adjuvant endocrine therapy with an aromatase inhibitor.  This would be effective treatment for both the right and left sided cancers.  We reviewed the potential side effects of anastrozole including hot flashes, vaginal dryness, joint pain, decrease in bone density, and mood or sleep disturbance.    She says that she likely will proceed with treatment.  However she lives about [place] half hours away from [place] and wants to think about whether it would be better to do treatment closer to home.  They live about an hour from a larger [facility] in [place] that would be more convenient for them.  I will go ahead and request port placement for her treatment as she  says she does not have good veins.  She and her  will discuss and will call next week once they have made a decision about where she would like to receive her adjuvant therapy.           Crystal Bee MD    7/10/2019

## 2019-07-12 NOTE — PROGRESS NOTES
Saw patient and  with Dr. CROCKER regarding patient's new breast cancer diagnosis. Path report and surgical options were discussed. Patient has opted for bilateral mastectomies. Gave and reviewed educational material. All questions have been answered and patient will call with any new concerns. SC

## 2019-07-18 ENCOUNTER — TELEPHONE (OUTPATIENT)
Dept: ONCOLOGY | Facility: CLINIC | Age: 69
End: 2019-07-18

## 2019-07-18 NOTE — TELEPHONE ENCOUNTER
----- Message from Raegan Arias sent at 7/18/2019  9:42 AM EDT -----  Regarding: [facility]- SCHEDULE CHEMOTHERAPY   Contact: [phone]  [facility] CALLED AND LEFT VOICEMAIL SAYING THAT THEY NEED TO SCHEDULE CHEMOTHERAPY.

## 2019-07-18 NOTE — TELEPHONE ENCOUNTER
Returned call and spoke with . The patient has decided to pursue treatment here at [facility]. Let him know I would go ahead and enter the treatment plan recommended/discussed by Dr Bee at their consult visit and send it to auth team to begin pre-certification. We would also work on getting her set up for port placement with Dr. CROCKER, and schedule chemo education visit with NP, follow up clinic visit with Dr Bee, and infusion apt to begin treatment.      states they are going out of town, and will not be available to start anything until Monday Aug. 5. I have passed this information along to care team.

## 2019-08-05 ENCOUNTER — TELEPHONE (OUTPATIENT)
Dept: ONCOLOGY | Facility: CLINIC | Age: 69
End: 2019-08-05

## 2019-08-05 ENCOUNTER — TRANSCRIBE ORDERS (OUTPATIENT)
Dept: ADMINISTRATIVE | Facility: HOSPITAL | Age: 69
End: 2019-08-05

## 2019-08-05 DIAGNOSIS — C50.412 MALIGNANT NEOPLASM OF UPPER-OUTER QUADRANT OF LEFT FEMALE BREAST, UNSPECIFIED ESTROGEN RECEPTOR STATUS (HCC): Primary | ICD-10-CM

## 2019-08-05 NOTE — PROGRESS NOTES
"CHEMOTHERAPY PREPARATION    Cherelle Lara  [id number]  1950    Chief Complaint: chemo preparation visit.      History of present illness:  Cherelle Lara is a 69 y.o. year old female who is here today for chemotherapy preparation and needs assessment. 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     Oncology History:     No history exists.       Past Medical History:   Diagnosis Date   • Anxiety    • Arthritis    • Back pain    • Breast cancer (CMS/HCC)    • Hyperlipidemia    • Hypertension        Past Surgical History:   Procedure Laterality Date   • CATARACT EXTRACTION Bilateral    • COLONOSCOPY  2014   • HYSTERECTOMY     • LAPAROSCOPIC SALPINGOOPHERECTOMY Left    • MASTECTOMY W/ SENTINEL NODE BIOPSY Bilateral 7/2/2019    Procedure: BILATERAL MASTECTOMIES,LEFT SENTINEL NODE BIOPSY;  Surgeon: Andrew Peters MD;  Location: [facility];  Service: General   • VENOUS ACCESS DEVICE (PORT) INSERTION Right 08/06/2019       MEDICATIONS: The current medication list was reviewed and reconciled.     Allergies:  is allergic to sulfa antibiotics.    History reviewed. No pertinent family history.      Review of Systems   Constitutional: Positive for fatigue. Negative for chills and fever.   HENT: Negative.    Eyes: Negative.    Respiratory: Negative.    Cardiovascular: Negative.    Gastrointestinal: Negative.    Endocrine: Negative.    Genitourinary: Negative.    Musculoskeletal: Negative.    Skin: Negative.    Allergic/Immunologic: Negative.    Neurological: Negative.    Hematological: Negative.    Psychiatric/Behavioral: The patient is nervous/anxious.        Physical Exam  Vital Signs: /86 Comment: RLE  Pulse 69   Temp 98.2 °F (36.8 °C) (Temporal)   Resp 18   Ht 152.4 cm (60\")   Wt " 60.3 kg (133 lb)   SpO2 97% Comment: RA  BMI 25.97 kg/m²    General Appearance:  alert, cooperative, no apparent distress and appears stated age   Neurologic/Psychiatric: A&O x 3, gait steady, appropriate affect   HEENT:  Normocephalic, without obvious abnormality, mucous membranes moist   Lungs:   Clear to auscultation bilaterally; respirations regular, even, and unlabored bilaterally   Heart:  Regular rate and rhythm, no murmurs appreciated   Extremities: Normal, atraumatic; no clubbing, cyanosis, or edema    Skin: No rashes, lesions, or abnormal coloration noted     ECOG Performance Status: (0) Fully active, able to carry on all predisease performance without restriction          NEEDS ASSESSMENTS    Genetics  The patient's new diagnosis and family history have been reviewed for genetic counseling needs. A genetic referral is not recommended.     Psychosocial  The patient has completed a PHQ-9 Depression Screening and the Distress Thermometer (DT) today.   PHQ-9 results show 5-9 (Mild Depression). The patient scored their distress today as 2 on a scale of 0-10 with 0 being no distress and 10 being extreme distress.   Problems marked by the patient as being an issue for them within the last week include emotional problems and physical problems.   Results were reviewed along with psychosocial resources offered by our cancer center. Our oncology social worker will be flagged for a DT score of 4 or above, and a same day call will be made for a score of 9 or 10. A mental health referral is recommended at this time. The patient is not accepting of a referral to [facility].   Copies of patient's questionnaires will be scanned into EMR for details and further reference.    Barriers to care  A barriers form was also completed by the patient today. We discussed services offered by our facility to help her have adequate access to care. The patient was given the name and card for our Oncology Social Worker, Gila  "John Paul. Based upon barriers assessment today, the patient will not require a follow-up call from the  to further discuss needs.   A copy of the barriers form will also be scanned into EMR for details and further reference.     VAD Assessment  The patient and I discussed planned intervenous chemotherapy as well as other IV treatments that are often needed throughout the course of treatment. These may include, but are not limited to blood transfusions, antibiotics, and IV hydration. The vasculature does not appear to be adequate for multiple peripheral IVs throughout their treatment course. Discussed risks and benefits of VADs. The patient had a  Port-A-Cath inserted already.     Advanced Care Planning  The patient and I discussed advanced care planning, \"Conversations that Matter\".   This service was offered, free of charge, for development of advance directives with a certified ACP facilitator.  The patient does not have an up-to-date advanced directive. This document is not on file with our office. The patient is not interested in an appointment with one of our facilitators to create or update their advanced directives.      Palliative Care  The patient and I discussed palliative care services. Palliative care is not the same as Hospice care. This is specialized medical care for people living with serious illness with the goal of improving quality of life for the patient and their family. [facility] has partnered with [facility] to offer our patients outpatient palliative care early along with their treatment to assist in coordination of care, symptom management, pain management, and medical decision making.  Oncology criteria for palliative care referral is not met at this time. The patient is not interested in a palliative care consultation.     Additional Referral needs  none      CHEMOTHERAPY EDUCATION    Booklets Given: Chemotherapy and You [x]  Eating Hints [x]    " Sexuality/Fertility Books []      Chemotherapy/Biotherapy Education Sheets: (list all that apply)  nausea management, acid reflux management, diarrhea management, Cancer resourse contacts information, skin and mouth care and vaccination information                                                                                                                                                                 Chemotherapy Regimen:   Treatment Plans     Name Type Plan dates Plan Provider         Active    OP BREAST TC DOCEtaxel / Cyclophosphamide ONCOLOGY TREATMENT  8/6/2019 - Present Crystal Bee MD                    TOPICS EDUCATION PROVIDED COMMENTS   ANEMIA:  role of RBC, cause, s/s, ways to manage, role of transfusion [x]    THROMBOCYTOPENIA:  role of platelet, cause, s/s, ways to prevent bleeding, things to avoid, when to seek help [x]    NEUTROPENIA:  role of WBC, cause, infection precautions, s/s of infection, when to call MD [x]    NUTRITION & APPETITE CHANGES:  importance of maintaining healthy diet & weight, ways to manage to improve intake, dietary consult, exercise regimen [x]    DIARRHEA:  causes, s/s of dehydration, ways to manage, dietary changes, when to call MD [x]    CONSTIPATION:  causes, ways to manage, dietary changes, when to call MD [x]    NAUSEA & VOMITING:  cause, use of antiemetics, dietary changes, when to call MD [x]    MOUTH SORES:  causes, oral care, ways to manage [x]    ALOPECIA:  cause, ways to manage, resources [x]    INFERTILITY & SEXUALITY:  causes, fertility preservation options, sexuality changes, ways to manage, importance of birth control [x]    NERVOUS SYSTEM CHANGES:  causes, s/s, neuropathies, cognitive changes, ways to manage [x]    PAIN:  causes, ways to manage [x] ????   SKIN & NAIL CHANGES:  cause, s/s, ways to manage [x]    ORGAN TOXICITIES:  cause, s/s, need for diagnostic tests, labs, when to notify MD [x]    SURVIVORSHIP:  distress, distress assessment, secondary  malignancies, early/late effects, follow-up, social issues, social support [x]    HOME CARE:  use of spill kits, storing of PO chemo, how to manage bodily fluids [x]    MISCELLANEOUS:  drug interactions, administration, vesicant, et [x]        Assessment and Plan:    Diagnoses and all orders for this visit:    Malignant neoplasm of right breast in female, estrogen receptor positive, unspecified site of breast (CMS/HCC)    Malignant neoplasm of upper-outer quadrant of left breast in female, estrogen receptor positive (CMS/HCC)  -     dexamethasone (DECADRON) 4 MG tablet; Take 2 tablets oral twice a day for 3 consecutive days beginning the day before chemotherapy and continue for 6 doses.  -     ondansetron (ZOFRAN) 8 MG tablet; Take 1 tablet by mouth 3 (Three) Times a Day As Needed for Nausea or Vomiting.    Other orders  -     lidocaine-prilocaine (EMLA) 2.5-2.5 % cream; Apply topically to the appropriate area as directed As Needed (45-60 minutes prior to port access.  Cover with saran/plastic wrap).        This was a 40 minute face-to-face visit with 35 minutes spent in  counseling and coordination of care as documented above.   The patient and I have reviewed their new cancer diagnosis and scheduled treatment plan. Needs assessment was completed including genetics, psychosocial needs, barriers to care, VAD evaluation, advanced care planning, and palliative care services. Referrals have been ordered as appropriate based upon our evaluation and patient desires.     Chemotherapy teaching was also completed today as documented above. Adequate time was given to answer all questions to her satisfaction. Patient and family are aware of their care team members and contact information if they have questions or problems throughout the treatment course. Needs assessments and education has been completed. The patient is adequately prepared to begin treatment as scheduled.     Reviewed with patient education regarding EMLA  cream, Dexamethasone and Zofran prescription sent to pharmacy. Zofran 8 mg by mouth every 8 hours as needed for nausea vomiting. EMLA cream apply to appropriate area 45-60 minutes prior to port access, cover with saran/plastic wrap.  Dexamethasone 4 mg tablets Take 2 tablets oral twice a day for 3 consecutive days beginning the day before chemotherapy and continue for 6 doses.     I advised the patient that she can take Tylenol or Ibuprofen as needed for aches/pains related to cancer/treatment. I also advised patient she could use Senakot or Miralax as needed for constipation or Imodium as needed for diarrhea.       I reviewed with the patient the care team members. I also reviewed the option of the urgent care clinic through our oncology office for evaluation and management of symptoms related to treatment.    Maryam Dubois, APRN   08/06/2019

## 2019-08-05 NOTE — TELEPHONE ENCOUNTER
Received call from . He wanted to confirm pt's apt times for this week. Let him know she has a chemotherapy education apt with the nurse practitioner, Brittany, tomorrow at 2pm and follow up apt in clinic with Dr Bee Wednesday 8/7 @8:45 with treatment following in our OP infusion center.  v/u and I provided him with [facility] Surgeon's office number at his request so he could also confirm apts with Dr CROCKER.

## 2019-08-06 ENCOUNTER — OFFICE VISIT (OUTPATIENT)
Dept: ONCOLOGY | Facility: CLINIC | Age: 69
End: 2019-08-06

## 2019-08-06 ENCOUNTER — HOSPITAL ENCOUNTER (OUTPATIENT)
Dept: GENERAL RADIOLOGY | Facility: HOSPITAL | Age: 69
Discharge: HOME | End: 2019-08-06

## 2019-08-06 VITALS
WEIGHT: 133 LBS | BODY MASS INDEX: 26.11 KG/M2 | DIASTOLIC BLOOD PRESSURE: 86 MMHG | OXYGEN SATURATION: 97 % | SYSTOLIC BLOOD PRESSURE: 177 MMHG | TEMPERATURE: 98.2 F | RESPIRATION RATE: 18 BRPM | HEART RATE: 69 BPM | HEIGHT: 60 IN

## 2019-08-06 DIAGNOSIS — Z17.0 MALIGNANT NEOPLASM OF UPPER-OUTER QUADRANT OF LEFT BREAST IN FEMALE, ESTROGEN RECEPTOR POSITIVE (HCC): ICD-10-CM

## 2019-08-06 DIAGNOSIS — C50.412 MALIGNANT NEOPLASM OF UPPER-OUTER QUADRANT OF LEFT FEMALE BREAST, UNSPECIFIED ESTROGEN RECEPTOR STATUS (HCC): ICD-10-CM

## 2019-08-06 DIAGNOSIS — Z17.0 MALIGNANT NEOPLASM OF RIGHT BREAST IN FEMALE, ESTROGEN RECEPTOR POSITIVE, UNSPECIFIED SITE OF BREAST (HCC): Primary | ICD-10-CM

## 2019-08-06 DIAGNOSIS — C50.412 MALIGNANT NEOPLASM OF UPPER-OUTER QUADRANT OF LEFT BREAST IN FEMALE, ESTROGEN RECEPTOR POSITIVE (HCC): ICD-10-CM

## 2019-08-06 DIAGNOSIS — C50.911 MALIGNANT NEOPLASM OF RIGHT BREAST IN FEMALE, ESTROGEN RECEPTOR POSITIVE, UNSPECIFIED SITE OF BREAST (HCC): Primary | ICD-10-CM

## 2019-08-06 PROCEDURE — 99215 OFFICE O/P EST HI 40 MIN: CPT | Performed by: NURSE PRACTITIONER

## 2019-08-06 PROCEDURE — 71045 X-RAY EXAM CHEST 1 VIEW: CPT

## 2019-08-06 RX ORDER — LIDOCAINE AND PRILOCAINE 25; 25 MG/G; MG/G
CREAM TOPICAL AS NEEDED
Qty: 30 G | Refills: 2 | Status: SHIPPED | OUTPATIENT
Start: 2019-08-06 | End: 2020-02-13

## 2019-08-06 RX ORDER — ONDANSETRON HYDROCHLORIDE 8 MG/1
8 TABLET, FILM COATED ORAL 3 TIMES DAILY PRN
Qty: 30 TABLET | Refills: 5 | Status: SHIPPED | OUTPATIENT
Start: 2019-08-06 | End: 2020-02-13

## 2019-08-06 RX ORDER — DEXAMETHASONE 4 MG/1
TABLET ORAL
Qty: 12 TABLET | Refills: 3 | Status: SHIPPED | OUTPATIENT
Start: 2019-08-06 | End: 2019-11-13

## 2019-08-07 ENCOUNTER — DOCUMENTATION (OUTPATIENT)
Dept: NUTRITION | Facility: HOSPITAL | Age: 69
End: 2019-08-07

## 2019-08-07 ENCOUNTER — HOSPITAL ENCOUNTER (OUTPATIENT)
Dept: ONCOLOGY | Facility: HOSPITAL | Age: 69
Setting detail: INFUSION SERIES
Discharge: HOME | End: 2019-08-07

## 2019-08-07 ENCOUNTER — EDUCATION (OUTPATIENT)
Dept: ONCOLOGY | Facility: HOSPITAL | Age: 69
End: 2019-08-07

## 2019-08-07 ENCOUNTER — OFFICE VISIT (OUTPATIENT)
Dept: ONCOLOGY | Facility: CLINIC | Age: 69
End: 2019-08-07

## 2019-08-07 VITALS
WEIGHT: 131 LBS | BODY MASS INDEX: 25.72 KG/M2 | SYSTOLIC BLOOD PRESSURE: 192 MMHG | OXYGEN SATURATION: 97 % | HEIGHT: 60 IN | TEMPERATURE: 97.6 F | HEART RATE: 96 BPM | RESPIRATION RATE: 18 BRPM | DIASTOLIC BLOOD PRESSURE: 91 MMHG

## 2019-08-07 VITALS — DIASTOLIC BLOOD PRESSURE: 70 MMHG | SYSTOLIC BLOOD PRESSURE: 150 MMHG | HEART RATE: 92 BPM

## 2019-08-07 DIAGNOSIS — Z17.0 MALIGNANT NEOPLASM OF UPPER-OUTER QUADRANT OF LEFT BREAST IN FEMALE, ESTROGEN RECEPTOR POSITIVE (HCC): Primary | ICD-10-CM

## 2019-08-07 DIAGNOSIS — C50.412 MALIGNANT NEOPLASM OF UPPER-OUTER QUADRANT OF LEFT BREAST IN FEMALE, ESTROGEN RECEPTOR POSITIVE (HCC): Primary | ICD-10-CM

## 2019-08-07 DIAGNOSIS — Z45.2 FITTING AND ADJUSTMENT OF VASCULAR CATHETER: ICD-10-CM

## 2019-08-07 PROBLEM — C50.911 MALIGNANT NEOPLASM OF RIGHT BREAST IN FEMALE, ESTROGEN RECEPTOR POSITIVE (HCC): Status: RESOLVED | Noted: 2019-07-10 | Resolved: 2019-08-07

## 2019-08-07 LAB
ALBUMIN SERPL-MCNC: 4.3 G/DL (ref 3.5–5.2)
ALBUMIN/GLOB SERPL: 1.3 G/DL
ALP SERPL-CCNC: 74 U/L (ref 39–117)
ALT SERPL W P-5'-P-CCNC: 9 U/L (ref 1–33)
ANION GAP SERPL CALCULATED.3IONS-SCNC: 14 MMOL/L (ref 5–15)
AST SERPL-CCNC: 17 U/L (ref 1–32)
BILIRUB SERPL-MCNC: 0.2 MG/DL (ref 0.2–1.2)
BUN BLD-MCNC: 11 MG/DL (ref 8–23)
BUN/CREAT SERPL: 15.5 (ref 7–25)
CALCIUM SPEC-SCNC: 9.3 MG/DL (ref 8.6–10.5)
CHLORIDE SERPL-SCNC: 107 MMOL/L (ref 98–107)
CO2 SERPL-SCNC: 23 MMOL/L (ref 22–29)
CREAT BLD-MCNC: 0.71 MG/DL (ref 0.57–1)
ERYTHROCYTE [DISTWIDTH] IN BLOOD BY AUTOMATED COUNT: 15.2 % (ref 12.3–15.4)
GFR SERPL CREATININE-BSD FRML MDRD: 81 ML/MIN/1.73
GLOBULIN UR ELPH-MCNC: 3.3 GM/DL
GLUCOSE BLD-MCNC: 278 MG/DL (ref 65–99)
HCT VFR BLD AUTO: 40.4 % (ref 34–46.6)
HGB BLD-MCNC: 13.6 G/DL (ref 12–15.9)
LYMPHOCYTES # BLD AUTO: 1 10*3/MM3 (ref 0.7–3.1)
LYMPHOCYTES NFR BLD AUTO: 5 % (ref 19.6–45.3)
MCH RBC QN AUTO: 31 PG (ref 26.6–33)
MCHC RBC AUTO-ENTMCNC: 33.7 G/DL (ref 31.5–35.7)
MCV RBC AUTO: 91.9 FL (ref 79–97)
MONOCYTES # BLD AUTO: 0.2 10*3/MM3 (ref 0.1–0.9)
MONOCYTES NFR BLD AUTO: 1.3 % (ref 5–12)
NEUTROPHILS # BLD AUTO: 18 10*3/MM3 (ref 1.7–7)
NEUTROPHILS NFR BLD AUTO: 93.7 % (ref 42.7–76)
PLATELET # BLD AUTO: 216 10*3/MM3 (ref 140–450)
PMV BLD AUTO: 9.6 FL (ref 6–12)
POTASSIUM BLD-SCNC: 3.8 MMOL/L (ref 3.5–5.2)
PROT SERPL-MCNC: 7.6 G/DL (ref 6–8.5)
RBC # BLD AUTO: 4.4 10*6/MM3 (ref 3.77–5.28)
SODIUM BLD-SCNC: 144 MMOL/L (ref 136–145)
WBC NRBC COR # BLD: 19.2 10*3/MM3 (ref 3.4–10.8)

## 2019-08-07 PROCEDURE — 25010000002 DOCETAXEL 20 MG/ML SOLUTION 4 ML VIAL: Performed by: INTERNAL MEDICINE

## 2019-08-07 PROCEDURE — 25010000002 PEGFILGRASTIM 6 MG/0.6ML PREFILLED SYRINGE KIT: Performed by: INTERNAL MEDICINE

## 2019-08-07 PROCEDURE — 99214 OFFICE O/P EST MOD 30 MIN: CPT | Performed by: INTERNAL MEDICINE

## 2019-08-07 PROCEDURE — 96377 APPLICATON ON-BODY INJECTOR: CPT

## 2019-08-07 PROCEDURE — 25010000002 DOCETAXEL 20 MG/ML SOLUTION 1 ML VIAL: Performed by: INTERNAL MEDICINE

## 2019-08-07 PROCEDURE — 80053 COMPREHEN METABOLIC PANEL: CPT | Performed by: NURSE PRACTITIONER

## 2019-08-07 PROCEDURE — 25010000002 CYCLOPHOSPHAMIDE PER 100 MG: Performed by: INTERNAL MEDICINE

## 2019-08-07 PROCEDURE — 96375 TX/PRO/DX INJ NEW DRUG ADDON: CPT

## 2019-08-07 PROCEDURE — 85025 COMPLETE CBC W/AUTO DIFF WBC: CPT | Performed by: NURSE PRACTITIONER

## 2019-08-07 PROCEDURE — 96413 CHEMO IV INFUSION 1 HR: CPT

## 2019-08-07 PROCEDURE — 96417 CHEMO IV INFUS EACH ADDL SEQ: CPT

## 2019-08-07 PROCEDURE — 25010000002 PALONOSETRON 0.25 MG/5ML SOLUTION PREFILLED SYRINGE: Performed by: INTERNAL MEDICINE

## 2019-08-07 RX ORDER — SODIUM CHLORIDE 9 MG/ML
250 INJECTION, SOLUTION INTRAVENOUS ONCE
Status: COMPLETED | OUTPATIENT
Start: 2019-08-07 | End: 2019-08-07

## 2019-08-07 RX ORDER — DIPHENHYDRAMINE HYDROCHLORIDE 50 MG/ML
50 INJECTION INTRAMUSCULAR; INTRAVENOUS AS NEEDED
Status: CANCELLED | OUTPATIENT
Start: 2019-08-07

## 2019-08-07 RX ORDER — SODIUM CHLORIDE 9 MG/ML
250 INJECTION, SOLUTION INTRAVENOUS ONCE
Status: CANCELLED | OUTPATIENT
Start: 2019-08-07

## 2019-08-07 RX ORDER — FAMOTIDINE 10 MG/ML
20 INJECTION, SOLUTION INTRAVENOUS AS NEEDED
Status: CANCELLED | OUTPATIENT
Start: 2019-08-07

## 2019-08-07 RX ORDER — FAMOTIDINE 10 MG/ML
20 INJECTION, SOLUTION INTRAVENOUS AS NEEDED
Status: DISCONTINUED | OUTPATIENT
Start: 2019-08-07 | End: 2019-08-08 | Stop reason: HOSPADM

## 2019-08-07 RX ORDER — PALONOSETRON 0.05 MG/ML
0.25 INJECTION, SOLUTION INTRAVENOUS ONCE
Status: COMPLETED | OUTPATIENT
Start: 2019-08-07 | End: 2019-08-07

## 2019-08-07 RX ORDER — SODIUM CHLORIDE 0.9 % (FLUSH) 0.9 %
10 SYRINGE (ML) INJECTION AS NEEDED
Status: CANCELLED | OUTPATIENT
Start: 2019-08-07

## 2019-08-07 RX ORDER — SODIUM CHLORIDE 0.9 % (FLUSH) 0.9 %
10 SYRINGE (ML) INJECTION AS NEEDED
Status: DISCONTINUED | OUTPATIENT
Start: 2019-08-07 | End: 2019-08-08 | Stop reason: HOSPADM

## 2019-08-07 RX ORDER — PALONOSETRON 0.05 MG/ML
0.25 INJECTION, SOLUTION INTRAVENOUS ONCE
Status: CANCELLED | OUTPATIENT
Start: 2019-08-07

## 2019-08-07 RX ORDER — DIPHENHYDRAMINE HYDROCHLORIDE 50 MG/ML
50 INJECTION INTRAMUSCULAR; INTRAVENOUS AS NEEDED
Status: DISCONTINUED | OUTPATIENT
Start: 2019-08-07 | End: 2019-08-08 | Stop reason: HOSPADM

## 2019-08-07 RX ADMIN — HEPARIN 500 UNITS: 100 SYRINGE at 13:33

## 2019-08-07 RX ADMIN — PALONOSETRON 0.25 MG: 0.25 INJECTION, SOLUTION INTRAVENOUS at 10:40

## 2019-08-07 RX ADMIN — PEGFILGRASTIM 6 MG: KIT SUBCUTANEOUS at 13:37

## 2019-08-07 RX ADMIN — SODIUM CHLORIDE 250 ML: 9 INJECTION, SOLUTION INTRAVENOUS at 10:40

## 2019-08-07 RX ADMIN — DOCETAXEL 120 MG: 20 INJECTION, SOLUTION, CONCENTRATE INTRAVENOUS at 11:45

## 2019-08-07 RX ADMIN — CYCLOPHOSPHAMIDE 1000 MG: 1 INJECTION, POWDER, FOR SOLUTION INTRAVENOUS; ORAL at 12:53

## 2019-08-07 RX ADMIN — SODIUM CHLORIDE, PRESERVATIVE FREE 10 ML: 5 INJECTION INTRAVENOUS at 13:33

## 2019-08-07 NOTE — PLAN OF CARE
Outpatient Infusion • [address] [facility] • [address] • [place] [id number] • [phone]      CHEMOTHERAPY EDUCATION SHEET    NAME:  Cherelle Cruz      : 1950           DATE: 19    Booklets Given: Chemotherapy and You []  Eating Hints []    Sexuality/Fertility Books []     Chemotherapy/Biotherapy Education Sheets: (list all that apply)  Docetaxel and Cyclophosphamide                                                                                                                                                                 Chemotherapy Regimen:  Docetaxel and Cyclophosphamide (Q21D)    TOPICS EDUCATION PROVIDED EDUCATION REINFORCED COMMENTS   ANEMIA:  role of RBC, cause, s/s, ways to manage, role of transfusion [] [x] Discussed risk of decreased red blood cells which leads to fatigue. Patient encouraged to stay active and continue normal activities.   THROMBOCYTOPENIA:  role of platelet, cause, s/s, ways to prevent bleeding, things to avoid, when to seek help [] [x] Discussed increased bleeding risk due to decreased platlets and to be extra cautious with activities such as brushing teeth and cooking.   NEUTROPENIA:  role of WBC, cause, infection precautions, s/s of infection, when to call MD [] [x] Discussed risk of increased infection due to low WBC count, infection prophylaxis measures, and signs of infection to be aware of.   NUTRITION & APPETITE CHANGES:  importance of maintaining healthy diet & weight, ways to manage to improve intake, dietary consult, exercise regimen [] [x] Emphasized the importance of a healthy diet and maintaing physical activity despite increased fatigue and low appetite.   DIARRHEA:  causes, s/s of dehydration, ways to manage, dietary changes, when to call MD [] [x] Discussed that diarrhea may occur during treatment and that over the counter loperamide can be used unless persistent in which MD should be contacted.   CONSTIPATION:  causes, ways to manage, dietary  changes, when to call MD [] [x] Risk of constipation occurring during treatment was discussed and to alert MD if this occurs so an appropriate regimen can be prescribed. Do not use over the counter enemas or suppositories.   NAUSEA & VOMITING:  cause, use of antiemetics, dietary changes, when to call MD [] [x] Spoke with them about the anti-emetics that they have been given as well as confirming that they filled their prescription for ondansetron. Alerted them that the nausea may worsen on day 3 due to other anti-emetics wearing off and to take the ondansetron as soon as they become nauseous and not to wait and see if it improves.   MOUTH SORES:  causes, oral care, ways to manage [] [x] Spoke with patient about the risk of getting mouth sores and discussed preventative mouth wash recipe of baking soda, water, and salt. If mouth sores occur alert MD for treatment with prescription mouthwash.   ALOPECIA:  cause, ways to manage, resources [] [x] Discussed with the patient that she may experience hair loss from treatment as well as informing her that if she would like to have a wig we can have a prescription written for her so that insurance will pay for it .   INFERTILITY & SEXUALITY:  causes, fertility preservation options, sexuality changes, ways to manage, importance of birth control [] [x] Importance of using a condom for the first 48 hours after treatment was discussed due to toxicity being present in body fluids.   NERVOUS SYSTEM CHANGES:  causes, s/s, neuropathies, cognitive changes, ways to manage [] [x] Discussed that they may experience peripheral neuropathy while on treatment.    PAIN:  causes, ways to manage [] [x] Discussed that they may experience bone pain due to Neulasta and to take loratadine to prevent. If bone pain occurs can use acetaminophen,Aleve, or ibuprofen over the counter. ??????   SKIN & NAIL CHANGES:  cause, s/s, ways to manage [] [x] Discussed increased risk of sunburn and to assure that  protective measures are taken while in the sun such as sunscreen , hat, long sleeve shirt. Also, informed patient that they may experience some skin and nail discoloration throughout treatment.   ORGAN TOXICITIES:  cause, s/s, need for diagnostic tests, labs, when to notify MD [] []    SURVIVORSHIP:  distress, distress assessment, secondary malignancies, early/late effects, follow-up, social issues, social support [] []    HOME CARE:  use of spill kits, storing of PO chemo, how to manage bodily fluids [] [x] Emphasized that gloves be worn by others when cleaning toilet that patient uses or cleaning up bodily fluids as well as washing laundry that contains bodily fluids separate from other clothes and putting toilet seat down when flushing toilet for 48 hours after treatment administration.   MISCELLANEOUS:  drug interactions, administration, vesicant, et [] []      Referrals:    None     Notes:  Provided education to both patient and son. Patient did not speak good English and refused interpretor services so son translated information. A patient specific treatment calendar was given along with Gosia's business card in case questions came up later in treatment course.

## 2019-08-07 NOTE — PROGRESS NOTES
"      PROBLEM LIST:  1. [id number] Invasive metaplastic carcinoma of the left breast, ER weakly +, HI weakly +, Her2 negative  A) biopsy 5/9/19 showed a grade 3 invasive carcinoma with ER 5%, HI 10%, Her2 0+.  ki67 30%.  Bilateral mastectomy on 7/2/19.   Pathology showed a 3.5 cm grade 2 metaplastic carcinoma, focally invading skeletal muscle.  0/1 SLN involved.  B) adjuvant chemotherapy with taxotere and cytoxan started on 8/7/19.  2. [id number] Invasive ductal carcinoma of the right breast, ER+ HI weakly +, Her2 negative   A) Bilateral mastectomy on 7/2/19.   Pathology showed a 8 mm intermediate grade IDC  2. Anxiety/depression  3. Hypertension  4. Hyperlipidemia                Subjective     CHIEF COMPLAINT: breast cancer    HISTORY OF PRESENT ILLNESS:   Cherelle Lara returns for follow-up.   She is here to begin chemotherapy with taxotere and cytoxan.  She started taking her steroids yesterday.  This made her feel a little jittery.  Her son who lives in [place] accompanies her for today's appointment.    Past Medical History, Past Surgical History, Social History, Family History have been reviewed and are without significant changes except as mentioned.    Review of Systems   A comprehensive 14 point review of systems was performed and was negative except as mentioned.    Medications:  The current medication list was reviewed in the EMR    ALLERGIES:    Allergies   Allergen Reactions   • Sulfa Antibiotics Anaphylaxis       Objective      BP (!) 192/91 Comment: RLE  Pulse 96   Temp 97.6 °F (36.4 °C) (Temporal)   Resp 18   Ht 152.4 cm (60\")   Wt 59.4 kg (131 lb)   SpO2 97% Comment: RA  BMI 25.58 kg/m²      Performance Status: 0    General: well appearing female in no acute distress  Neuro: alert and oriented  HEENT: sclera anicteric, oropharynx clear  Lymphatics: no cervical, supraclavicular, or axillary adenopathy  Cardiovascular: regular rate and rhythm, no murmurs  Lungs: clear to auscultation " bilaterally  Abdomen: soft, nontender, nondistended.  No palpable organomegaly  Extremeties: no lower extremity edema  Skin: no rashes, lesions, bruising, or petechiae  Psych: mood and affect appropriate      RECENT LABS:  Results for KRISTA ONEILL (MRN [id number]) as of 8/7/2019 10:59   Ref. Range 8/7/2019 09:49   WBC Latest Ref Range: 3.40 - 10.80 10*3/mm3 19.20 (H)   RBC Latest Ref Range: 3.77 - 5.28 10*6/mm3 4.40   Hemoglobin Latest Ref Range: 12.0 - 15.9 g/dL 13.6   Hematocrit Latest Ref Range: 34.0 - 46.6 % 40.4   RDW Latest Ref Range: 12.3 - 15.4 % 15.2   MCV Latest Ref Range: 79.0 - 97.0 fL 91.9   MCH Latest Ref Range: 26.6 - 33.0 pg 31.0   MCHC Latest Ref Range: 31.5 - 35.7 g/dL 33.7   MPV Latest Ref Range: 6.0 - 12.0 fL 9.6   Platelets Latest Ref Range: 140 - 450 10*3/mm3 216   Neutrophil Rel % Latest Ref Range: 42.7 - 76.0 % 93.7 (H)   Lymphocyte Rel % Latest Ref Range: 19.6 - 45.3 % 5.0 (L)   Monocyte Rel % Latest Ref Range: 5.0 - 12.0 % 1.3 (L)   Neutrophils Absolute Latest Ref Range: 1.70 - 7.00 10*3/mm3 18.00 (H)   Lymphocytes Absolute Latest Ref Range: 0.70 - 3.10 10*3/mm3 1.00   Monocytes Absolute Latest Ref Range: 0.10 - 0.90 10*3/mm3 0.20           Assessment/Plan   Krista Lara is a 69 y.o. 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    She will begin adjuvant chemotherapy today with Taxotere and cyclophosphamide.  We reviewed the most common side effects including hair loss, nausea, fatigue, and diarrhea.  She knows she can call with any concerns or questions during her treatment.    After completing chemotherapy she would be a candidate for adjuvant endocrine therapy with an aromatase inhibitor.     Her son will accompany her at the next visit.  After that she may need a  to be scheduled along with her visits.  (Frisian)                     I spent 25 minutes with the patient. I spent > 50% percent of this time counseling and discussing prognosis, diagnostic testing, evaluation, current status and management.        Crystal Bee MD  [facility]ology [facility] On[facility]    8/7/2019          CC:

## 2019-08-08 NOTE — PROGRESS NOTES
Oncology Nutrition Screening    Patient Name:  Cherelle Lara  YOB: 1950  MRN: [id number]  Date:  08/08/19  Physician:  Dr. Bee    Type of Cancer Treatment:   Surgery:  Bilateral mastectomy (7/2/19)  Chemotherapy: Taxotere / Cytoxan - every 21 days x 4    Patient Active Problem List   Diagnosis   • Malignant neoplasm of upper-outer quadrant of left female breast (CMS/HCC)   • Fitting and adjustment of vascular catheter       Current Outpatient Medications   Medication Sig Dispense Refill   • amLODIPine Besylate (NORVASC PO) Take 5 mg by mouth Daily.     • clonazePAM in ORA-PLUS-ora sweet Take 2.5 mg by mouth Daily.     • dexamethasone (DECADRON) 4 MG tablet Take 2 tablets oral twice a day for 3 consecutive days beginning the day before chemotherapy and continue for 6 doses. 12 tablet 3   • ENALAPRIL MALEATE PO Take 1 tablet by mouth Daily.     • lidocaine-prilocaine (EMLA) 2.5-2.5 % cream Apply topically to the appropriate area as directed As Needed (45-60 minutes prior to port access.  Cover with saran/plastic wrap). 30 g 2   • ondansetron (ZOFRAN) 8 MG tablet Take 1 tablet by mouth 3 (Three) Times a Day As Needed for Nausea or Vomiting. 30 tablet 5   • oxymetazoline (AFRIN) 0.05 % nasal spray 2 sprays into the nostril(s) as directed by provider As Needed for Congestion.     • SIMVASTATIN PO Take 10 mg by mouth Daily.       No current facility-administered medications for this visit.        Glycemic Risk:   Steriods    Weight:   Height: 60 inches  Weight: 131 lbs.   BMI: 25.6  Overweight  Weight - no recent weight changes reported    Oral Food Intake:  Regular Diet - No Restrictions    Hydration Status:   How many 8 ounce glass of water of fluid do you drink per day?  Patient reports drinking water throughout the day.    Enteral Feeding:   n/a    Nutrition Symptoms:   No Problems with Eating    Activity:   Normal with no limitations     reports that she quit smoking about 7 weeks ago. Her  "smoking use included cigarettes. She has a 50.00 pack-year smoking history. She has never used smokeless tobacco. She reports that she does not drink alcohol or use drugs.    Evaluation of Nutritional Risk:   Patient has been identified at mild nutritional risk due to diagnosis, treatment plan, and patient education.  Met with patient and her son during her initial chemotherapy infusion appointment.  Nutritional screening completed as above.    Discussed the importance of good nutrition during her treatment course focusing on adequate calorie, protein, nutrient and fluid intake.  Advised her to be consuming smaller more frequent meals/snacks throughout the day to aid with potential nausea management.  Emphasized the importance of protein and its role in the diet; reviewed high protein foods; and recommended she have a protein source at each meal/snack.  Also emphasized the importance of hydration; reviewed good hydrating fluid options; and recommended she drink at least 64 ounces daily.  Reviewed the ACS nutrition booklet and suggested she use it for symptom management as needed.  Provided and reviewed written diet material \"Nutritional Considerations in Breast Cancer\".    Answered their questions and both voiced understanding of information discussed.  RD's contact information provided and encouraged to call with questions.  Will follow up as indicated.    Electronically signed by:  Jocelyn Ingram RD  9:54 AM  "

## 2019-08-21 ENCOUNTER — TELEPHONE (OUTPATIENT)
Dept: ONCOLOGY | Facility: HOSPITAL | Age: 69
End: 2019-08-21

## 2019-08-21 NOTE — TELEPHONE ENCOUNTER
08/21/19 @ 10:20: Patient's  Declan had left a message about whether it was okay for patient to take Artane that was prescribed by her neurologist along with dexamethasone that she takes at time of her treatment due to him seeing something online that said they could not be taken together. I informed him that there is no interaction noted between these medications and that they are fine to take together.

## 2019-08-28 ENCOUNTER — OFFICE VISIT (OUTPATIENT)
Dept: ONCOLOGY | Facility: CLINIC | Age: 69
End: 2019-08-28

## 2019-08-28 ENCOUNTER — HOSPITAL ENCOUNTER (OUTPATIENT)
Dept: ONCOLOGY | Facility: HOSPITAL | Age: 69
Setting detail: INFUSION SERIES
Discharge: HOME | End: 2019-08-28

## 2019-08-28 VITALS
HEIGHT: 60 IN | BODY MASS INDEX: 25.72 KG/M2 | HEART RATE: 102 BPM | RESPIRATION RATE: 16 BRPM | DIASTOLIC BLOOD PRESSURE: 83 MMHG | TEMPERATURE: 97.7 F | OXYGEN SATURATION: 97 % | WEIGHT: 131 LBS | SYSTOLIC BLOOD PRESSURE: 151 MMHG

## 2019-08-28 DIAGNOSIS — Z17.0 MALIGNANT NEOPLASM OF UPPER-OUTER QUADRANT OF LEFT BREAST IN FEMALE, ESTROGEN RECEPTOR POSITIVE (HCC): ICD-10-CM

## 2019-08-28 DIAGNOSIS — C50.412 MALIGNANT NEOPLASM OF UPPER-OUTER QUADRANT OF LEFT BREAST IN FEMALE, ESTROGEN RECEPTOR POSITIVE (HCC): ICD-10-CM

## 2019-08-28 DIAGNOSIS — C50.412 MALIGNANT NEOPLASM OF UPPER-OUTER QUADRANT OF LEFT BREAST IN FEMALE, ESTROGEN RECEPTOR POSITIVE (HCC): Primary | ICD-10-CM

## 2019-08-28 DIAGNOSIS — L64.0 DRUG-INDUCED ANDROGENIC ALOPECIA: ICD-10-CM

## 2019-08-28 DIAGNOSIS — Z17.0 MALIGNANT NEOPLASM OF UPPER-OUTER QUADRANT OF LEFT BREAST IN FEMALE, ESTROGEN RECEPTOR POSITIVE (HCC): Primary | ICD-10-CM

## 2019-08-28 DIAGNOSIS — Z45.2 FITTING AND ADJUSTMENT OF VASCULAR CATHETER: Primary | ICD-10-CM

## 2019-08-28 LAB
ALBUMIN SERPL-MCNC: 4.6 G/DL (ref 3.5–5.2)
ALBUMIN/GLOB SERPL: 1.4 G/DL
ALP SERPL-CCNC: 74 U/L (ref 39–117)
ALT SERPL W P-5'-P-CCNC: 9 U/L (ref 1–33)
ANION GAP SERPL CALCULATED.3IONS-SCNC: 12 MMOL/L (ref 5–15)
AST SERPL-CCNC: 12 U/L (ref 1–32)
BILIRUB SERPL-MCNC: 0.3 MG/DL (ref 0.2–1.2)
BUN BLD-MCNC: 11 MG/DL (ref 8–23)
BUN/CREAT SERPL: 13.9 (ref 7–25)
CALCIUM SPEC-SCNC: 9.3 MG/DL (ref 8.6–10.5)
CHLORIDE SERPL-SCNC: 107 MMOL/L (ref 98–107)
CO2 SERPL-SCNC: 25 MMOL/L (ref 22–29)
CREAT BLD-MCNC: 0.79 MG/DL (ref 0.57–1)
ERYTHROCYTE [DISTWIDTH] IN BLOOD BY AUTOMATED COUNT: 15.4 % (ref 12.3–15.4)
GFR SERPL CREATININE-BSD FRML MDRD: 72 ML/MIN/1.73
GLOBULIN UR ELPH-MCNC: 3.2 GM/DL
GLUCOSE BLD-MCNC: 143 MG/DL (ref 65–99)
HCT VFR BLD AUTO: 38.3 % (ref 34–46.6)
HGB BLD-MCNC: 12.6 G/DL (ref 12–15.9)
LYMPHOCYTES # BLD AUTO: 1 10*3/MM3 (ref 0.7–3.1)
LYMPHOCYTES NFR BLD AUTO: 6.7 % (ref 19.6–45.3)
MCH RBC QN AUTO: 30.5 PG (ref 26.6–33)
MCHC RBC AUTO-ENTMCNC: 32.8 G/DL (ref 31.5–35.7)
MCV RBC AUTO: 93.1 FL (ref 79–97)
MONOCYTES # BLD AUTO: 0.6 10*3/MM3 (ref 0.1–0.9)
MONOCYTES NFR BLD AUTO: 4.1 % (ref 5–12)
NEUTROPHILS # BLD AUTO: 12.7 10*3/MM3 (ref 1.7–7)
NEUTROPHILS NFR BLD AUTO: 89.2 % (ref 42.7–76)
PLATELET # BLD AUTO: 347 10*3/MM3 (ref 140–450)
PMV BLD AUTO: 8 FL (ref 6–12)
POTASSIUM BLD-SCNC: 3.8 MMOL/L (ref 3.5–5.2)
PROT SERPL-MCNC: 7.8 G/DL (ref 6–8.5)
RBC # BLD AUTO: 4.11 10*6/MM3 (ref 3.77–5.28)
SODIUM BLD-SCNC: 144 MMOL/L (ref 136–145)
WBC NRBC COR # BLD: 14.2 10*3/MM3 (ref 3.4–10.8)

## 2019-08-28 PROCEDURE — 25010000002 PEGFILGRASTIM 6 MG/0.6ML PREFILLED SYRINGE KIT: Performed by: NURSE PRACTITIONER

## 2019-08-28 PROCEDURE — 99214 OFFICE O/P EST MOD 30 MIN: CPT | Performed by: NURSE PRACTITIONER

## 2019-08-28 PROCEDURE — 96417 CHEMO IV INFUS EACH ADDL SEQ: CPT

## 2019-08-28 PROCEDURE — 25010000002 DOCETAXEL 20 MG/ML SOLUTION 4 ML VIAL: Performed by: NURSE PRACTITIONER

## 2019-08-28 PROCEDURE — 80053 COMPREHEN METABOLIC PANEL: CPT | Performed by: NURSE PRACTITIONER

## 2019-08-28 PROCEDURE — 25010000002 CYCLOPHOSPHAMIDE PER 100 MG: Performed by: NURSE PRACTITIONER

## 2019-08-28 PROCEDURE — 96375 TX/PRO/DX INJ NEW DRUG ADDON: CPT

## 2019-08-28 PROCEDURE — 25010000002 DOCETAXEL 20 MG/ML SOLUTION 1 ML VIAL: Performed by: NURSE PRACTITIONER

## 2019-08-28 PROCEDURE — 96377 APPLICATON ON-BODY INJECTOR: CPT

## 2019-08-28 PROCEDURE — 96413 CHEMO IV INFUSION 1 HR: CPT

## 2019-08-28 PROCEDURE — 85025 COMPLETE CBC W/AUTO DIFF WBC: CPT | Performed by: NURSE PRACTITIONER

## 2019-08-28 PROCEDURE — 25010000002 PALONOSETRON 0.25 MG/5ML SOLUTION PREFILLED SYRINGE: Performed by: NURSE PRACTITIONER

## 2019-08-28 RX ORDER — PALONOSETRON 0.05 MG/ML
0.25 INJECTION, SOLUTION INTRAVENOUS ONCE
Status: CANCELLED | OUTPATIENT
Start: 2019-08-28

## 2019-08-28 RX ORDER — SODIUM CHLORIDE 9 MG/ML
250 INJECTION, SOLUTION INTRAVENOUS ONCE
Status: CANCELLED | OUTPATIENT
Start: 2019-08-28

## 2019-08-28 RX ORDER — SODIUM CHLORIDE 9 MG/ML
250 INJECTION, SOLUTION INTRAVENOUS ONCE
Status: COMPLETED | OUTPATIENT
Start: 2019-08-28 | End: 2019-08-28

## 2019-08-28 RX ORDER — FAMOTIDINE 10 MG/ML
20 INJECTION, SOLUTION INTRAVENOUS AS NEEDED
Status: CANCELLED | OUTPATIENT
Start: 2019-08-28

## 2019-08-28 RX ORDER — TRIHEXYPHENIDYL HYDROCHLORIDE 2 MG/1
2 TABLET ORAL
COMMUNITY
End: 2020-02-13

## 2019-08-28 RX ORDER — DIPHENHYDRAMINE HYDROCHLORIDE 50 MG/ML
50 INJECTION INTRAMUSCULAR; INTRAVENOUS AS NEEDED
Status: CANCELLED | OUTPATIENT
Start: 2019-08-28

## 2019-08-28 RX ORDER — PALONOSETRON 0.05 MG/ML
0.25 INJECTION, SOLUTION INTRAVENOUS ONCE
Status: COMPLETED | OUTPATIENT
Start: 2019-08-28 | End: 2019-08-28

## 2019-08-28 RX ORDER — SODIUM CHLORIDE 0.9 % (FLUSH) 0.9 %
10 SYRINGE (ML) INJECTION AS NEEDED
Status: DISCONTINUED | OUTPATIENT
Start: 2019-08-28 | End: 2019-08-29 | Stop reason: HOSPADM

## 2019-08-28 RX ORDER — SODIUM CHLORIDE 0.9 % (FLUSH) 0.9 %
10 SYRINGE (ML) INJECTION AS NEEDED
Status: CANCELLED | OUTPATIENT
Start: 2019-08-28

## 2019-08-28 RX ADMIN — SODIUM CHLORIDE 250 ML: 9 INJECTION, SOLUTION INTRAVENOUS at 11:10

## 2019-08-28 RX ADMIN — PEGFILGRASTIM 6 MG: KIT SUBCUTANEOUS at 12:19

## 2019-08-28 RX ADMIN — DOCETAXEL 120 MG: 20 INJECTION, SOLUTION, CONCENTRATE INTRAVENOUS at 11:14

## 2019-08-28 RX ADMIN — HEPARIN 500 UNITS: 100 SYRINGE at 12:50

## 2019-08-28 RX ADMIN — PALONOSETRON 0.25 MG: 0.25 INJECTION, SOLUTION INTRAVENOUS at 11:10

## 2019-08-28 RX ADMIN — CYCLOPHOSPHAMIDE 940 MG: 1 INJECTION, POWDER, FOR SOLUTION INTRAVENOUS; ORAL at 12:16

## 2019-08-28 NOTE — PROGRESS NOTES
"      PROBLEM LIST:  1. 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  ki67 30%.  Bilateral mastectomy on 7/2/19.   Pathology showed a 3.5 cm grade 2 metaplastic carcinoma, focally invading skeletal muscle.  0/1 SLN involved.  B) adjuvant chemotherapy with taxotere and cytoxan started on 8/7/19.  2. [id number] Invasive ductal carcinoma of the right breast, ER+ UT weakly +, Her2 negative   A) Bilateral mastectomy on 7/2/19.   Pathology showed a 8 mm intermediate grade IDC  2. Anxiety/depression  3. Hypertension  4. Hyperlipidemia                Subjective     CHIEF COMPLAINT: breast cancer    HISTORY OF PRESENT ILLNESS:   Cherelle Lara returns for follow-up.   She tolerated cycle 1 of taxotere and cytoxan relatively well.  She reports mild nausea and fatigue. The nausea improved with Zofran. She said her most bothersome side effect was deep bone pain worse in her hips. She took Tylenol twice a day from days 4 through 14 with only minimal relief in pain. She reports mild intermittent numbness and tingling in her feet. No fevers or chills.       Past Medical History, Past Surgical History, Social History, Family History have been reviewed and are without significant changes except as mentioned.    Review of Systems   A comprehensive 14 point review of systems was performed and was negative except as mentioned.    Medications:  The current medication list was reviewed in the EMR    ALLERGIES:    Allergies   Allergen Reactions   • Sulfa Antibiotics Anaphylaxis       Objective      /83   Pulse 102   Temp 97.7 °F (36.5 °C) (Temporal)   Resp 16   Ht 152.4 cm (60\")   Wt 59.4 kg (131 lb)   SpO2 97%   BMI 25.58 kg/m²      Performance Status: 0    General: well appearing female in no acute distress  Neuro: alert and oriented  HEENT: sclera anicteric, oropharynx " clear  Lymphatics: no cervical, supraclavicular, or axillary adenopathy  Cardiovascular: regular rate and rhythm, no murmurs  Lungs: clear to auscultation bilaterally  Chest wall: bilateral mastectomy incisions well healed. No masses or skin changes   Abdomen: soft, nontender, nondistended.  No palpable organomegaly  Extremeties: no lower extremity edema  Skin: no rashes, lesions, bruising, or petechiae  Psych: mood and affect appropriate      RECENT LABS:  WBC   Date Value Ref Range Status   08/07/2019 19.20 (H) 3.40 - 10.80 10*3/mm3 Final     RBC   Date Value Ref Range Status   08/07/2019 4.40 3.77 - 5.28 10*6/mm3 Final     Hemoglobin   Date Value Ref Range Status   08/07/2019 13.6 12.0 - 15.9 g/dL Final     Hematocrit   Date Value Ref Range Status   08/07/2019 40.4 34.0 - 46.6 % Final     MCV   Date Value Ref Range Status   08/07/2019 91.9 79.0 - 97.0 fL Final     MCH   Date Value Ref Range Status   08/07/2019 31.0 26.6 - 33.0 pg Final     MCHC   Date Value Ref Range Status   08/07/2019 33.7 31.5 - 35.7 g/dL Final     RDW   Date Value Ref Range Status   08/07/2019 15.2 12.3 - 15.4 % Final     RDW-SD   Date Value Ref Range Status   07/01/2019 49.5 37.0 - 54.0 fl Final     MPV   Date Value Ref Range Status   08/07/2019 9.6 6.0 - 12.0 fL Final     Platelets   Date Value Ref Range Status   08/07/2019 216 140 - 450 10*3/mm3 Final     Neutrophil %   Date Value Ref Range Status   08/07/2019 93.7 (H) 42.7 - 76.0 % Final     Lymphocyte %   Date Value Ref Range Status   08/07/2019 5.0 (L) 19.6 - 45.3 % Final     Monocyte %   Date Value Ref Range Status   08/07/2019 1.3 (L) 5.0 - 12.0 % Final     Neutrophils, Absolute   Date Value Ref Range Status   08/07/2019 18.00 (H) 1.70 - 7.00 10*3/mm3 Final     Lymphocytes, Absolute   Date Value Ref Range Status   08/07/2019 1.00 0.70 - 3.10 10*3/mm3 Final     Monocytes, Absolute   Date Value Ref Range Status   08/07/2019 0.20 0.10 - 0.90 10*3/mm3 Final     Lab Results   Component  Value Date    GLUCOSE 278 (H) 08/07/2019    BUN 11 08/07/2019    CREATININE 0.71 08/07/2019    EGFRIFNONA 81 08/07/2019    BCR 15.5 08/07/2019    K 3.8 08/07/2019    CO2 23.0 08/07/2019    CALCIUM 9.3 08/07/2019    ALBUMIN 4.30 08/07/2019    AST 17 08/07/2019    ALT 9 08/07/2019             Assessment/Plan   Cherelle Lara is a 69 y.o. year old female with a pT2N0 ER and ID weakly positive, her2 negative metaplastic carcinoma of the left breast, as well as a T1N0 ER+ Her2 negaitve IDC of the right breast.    She will proceed with cycle 2 of adjuvant chemotherapy today with Taxotere and cyclophosphamide.      Bone pain: Alternate Tylenol and Ibuprofen 400 mg po q 6 hours prn pain.     Prescriptions given for cranial prosthesis and mastectomy bras and prosthesis.      After completing chemotherapy she would be a candidate for adjuvant endocrine therapy with an aromatase inhibitor.     Her son will accompany her at the next visit.  After that she may need a  to be scheduled along with her visits.  (Hebrew)                    I spent 25 minutes with the patient. I spent > 50% percent of this time counseling and discussing prognosis, diagnostic testing, evaluation, current status and management.        Maryam Dubois APRN  [facility] Hematology and Oncology    8/28/2019          CC:

## 2019-09-18 ENCOUNTER — OFFICE VISIT (OUTPATIENT)
Dept: ONCOLOGY | Facility: CLINIC | Age: 69
End: 2019-09-18

## 2019-09-18 ENCOUNTER — HOSPITAL ENCOUNTER (OUTPATIENT)
Dept: ONCOLOGY | Facility: HOSPITAL | Age: 69
Setting detail: INFUSION SERIES
Discharge: HOME | End: 2019-09-18

## 2019-09-18 VITALS
BODY MASS INDEX: 25.52 KG/M2 | HEART RATE: 100 BPM | TEMPERATURE: 97.9 F | SYSTOLIC BLOOD PRESSURE: 183 MMHG | DIASTOLIC BLOOD PRESSURE: 86 MMHG | HEIGHT: 60 IN | RESPIRATION RATE: 18 BRPM | WEIGHT: 130 LBS | OXYGEN SATURATION: 98 %

## 2019-09-18 DIAGNOSIS — C50.412 MALIGNANT NEOPLASM OF UPPER-OUTER QUADRANT OF LEFT BREAST IN FEMALE, ESTROGEN RECEPTOR POSITIVE (HCC): Primary | ICD-10-CM

## 2019-09-18 DIAGNOSIS — Z45.2 FITTING AND ADJUSTMENT OF VASCULAR CATHETER: ICD-10-CM

## 2019-09-18 DIAGNOSIS — Z17.0 MALIGNANT NEOPLASM OF UPPER-OUTER QUADRANT OF LEFT BREAST IN FEMALE, ESTROGEN RECEPTOR POSITIVE (HCC): Primary | ICD-10-CM

## 2019-09-18 LAB
ALBUMIN SERPL-MCNC: 4.5 G/DL (ref 3.5–5.2)
ALBUMIN/GLOB SERPL: 1.8 G/DL
ALP SERPL-CCNC: 70 U/L (ref 39–117)
ALT SERPL W P-5'-P-CCNC: 8 U/L (ref 1–33)
ANION GAP SERPL CALCULATED.3IONS-SCNC: 12 MMOL/L (ref 5–15)
AST SERPL-CCNC: 10 U/L (ref 1–32)
BILIRUB SERPL-MCNC: 0.3 MG/DL (ref 0.2–1.2)
BUN BLD-MCNC: 12 MG/DL (ref 8–23)
BUN/CREAT SERPL: 17.4 (ref 7–25)
CALCIUM SPEC-SCNC: 9 MG/DL (ref 8.6–10.5)
CHLORIDE SERPL-SCNC: 107 MMOL/L (ref 98–107)
CO2 SERPL-SCNC: 26 MMOL/L (ref 22–29)
CREAT BLD-MCNC: 0.69 MG/DL (ref 0.57–1)
ERYTHROCYTE [DISTWIDTH] IN BLOOD BY AUTOMATED COUNT: 17.3 % (ref 12.3–15.4)
GFR SERPL CREATININE-BSD FRML MDRD: 84 ML/MIN/1.73
GLOBULIN UR ELPH-MCNC: 2.5 GM/DL
GLUCOSE BLD-MCNC: 134 MG/DL (ref 65–99)
HCT VFR BLD AUTO: 34.2 % (ref 34–46.6)
HGB BLD-MCNC: 11.3 G/DL (ref 12–15.9)
LYMPHOCYTES # BLD AUTO: 0.9 10*3/MM3 (ref 0.7–3.1)
LYMPHOCYTES NFR BLD AUTO: 7.6 % (ref 19.6–45.3)
MCH RBC QN AUTO: 31.3 PG (ref 26.6–33)
MCHC RBC AUTO-ENTMCNC: 33.1 G/DL (ref 31.5–35.7)
MCV RBC AUTO: 94.6 FL (ref 79–97)
MONOCYTES # BLD AUTO: 0.5 10*3/MM3 (ref 0.1–0.9)
MONOCYTES NFR BLD AUTO: 4.5 % (ref 5–12)
NEUTROPHILS # BLD AUTO: 10.6 10*3/MM3 (ref 1.7–7)
NEUTROPHILS NFR BLD AUTO: 87.9 % (ref 42.7–76)
PLATELET # BLD AUTO: 236 10*3/MM3 (ref 140–450)
PMV BLD AUTO: 8.2 FL (ref 6–12)
POTASSIUM BLD-SCNC: 3.8 MMOL/L (ref 3.5–5.2)
PROT SERPL-MCNC: 7 G/DL (ref 6–8.5)
RBC # BLD AUTO: 3.61 10*6/MM3 (ref 3.77–5.28)
SODIUM BLD-SCNC: 145 MMOL/L (ref 136–145)
WBC NRBC COR # BLD: 12.1 10*3/MM3 (ref 3.4–10.8)

## 2019-09-18 PROCEDURE — 99214 OFFICE O/P EST MOD 30 MIN: CPT | Performed by: INTERNAL MEDICINE

## 2019-09-18 PROCEDURE — 25010000002 PALONOSETRON 0.25 MG/5ML SOLUTION PREFILLED SYRINGE: Performed by: INTERNAL MEDICINE

## 2019-09-18 PROCEDURE — 80053 COMPREHEN METABOLIC PANEL: CPT | Performed by: INTERNAL MEDICINE

## 2019-09-18 PROCEDURE — 96417 CHEMO IV INFUS EACH ADDL SEQ: CPT

## 2019-09-18 PROCEDURE — 25010000002 PEGFILGRASTIM 6 MG/0.6ML PREFILLED SYRINGE KIT: Performed by: INTERNAL MEDICINE

## 2019-09-18 PROCEDURE — 96377 APPLICATON ON-BODY INJECTOR: CPT

## 2019-09-18 PROCEDURE — 96413 CHEMO IV INFUSION 1 HR: CPT

## 2019-09-18 PROCEDURE — 25010000002 DOCETAXEL 20 MG/ML SOLUTION 4 ML VIAL: Performed by: INTERNAL MEDICINE

## 2019-09-18 PROCEDURE — 96375 TX/PRO/DX INJ NEW DRUG ADDON: CPT

## 2019-09-18 PROCEDURE — 25010000002 CYCLOPHOSPHAMIDE PER 100 MG: Performed by: INTERNAL MEDICINE

## 2019-09-18 PROCEDURE — 85025 COMPLETE CBC W/AUTO DIFF WBC: CPT | Performed by: INTERNAL MEDICINE

## 2019-09-18 PROCEDURE — 25010000002 DOCETAXEL 20 MG/ML SOLUTION 1 ML VIAL: Performed by: INTERNAL MEDICINE

## 2019-09-18 RX ORDER — FAMOTIDINE 10 MG/ML
20 INJECTION, SOLUTION INTRAVENOUS AS NEEDED
Status: CANCELLED | OUTPATIENT
Start: 2019-09-18

## 2019-09-18 RX ORDER — SODIUM CHLORIDE 9 MG/ML
250 INJECTION, SOLUTION INTRAVENOUS ONCE
Status: CANCELLED | OUTPATIENT
Start: 2019-09-18

## 2019-09-18 RX ORDER — SODIUM CHLORIDE 0.9 % (FLUSH) 0.9 %
10 SYRINGE (ML) INJECTION AS NEEDED
Status: DISCONTINUED | OUTPATIENT
Start: 2019-09-18 | End: 2019-09-19 | Stop reason: HOSPADM

## 2019-09-18 RX ORDER — DIPHENHYDRAMINE HYDROCHLORIDE 50 MG/ML
50 INJECTION INTRAMUSCULAR; INTRAVENOUS AS NEEDED
Status: CANCELLED | OUTPATIENT
Start: 2019-09-18

## 2019-09-18 RX ORDER — PALONOSETRON 0.05 MG/ML
0.25 INJECTION, SOLUTION INTRAVENOUS ONCE
Status: CANCELLED | OUTPATIENT
Start: 2019-09-18

## 2019-09-18 RX ORDER — SODIUM CHLORIDE 0.9 % (FLUSH) 0.9 %
10 SYRINGE (ML) INJECTION AS NEEDED
Status: CANCELLED | OUTPATIENT
Start: 2019-09-18

## 2019-09-18 RX ORDER — SODIUM CHLORIDE 9 MG/ML
250 INJECTION, SOLUTION INTRAVENOUS ONCE
Status: COMPLETED | OUTPATIENT
Start: 2019-09-18 | End: 2019-09-18

## 2019-09-18 RX ORDER — PALONOSETRON 0.05 MG/ML
0.25 INJECTION, SOLUTION INTRAVENOUS ONCE
Status: COMPLETED | OUTPATIENT
Start: 2019-09-18 | End: 2019-09-18

## 2019-09-18 RX ADMIN — PALONOSETRON 0.25 MG: 0.25 INJECTION, SOLUTION INTRAVENOUS at 09:47

## 2019-09-18 RX ADMIN — PEGFILGRASTIM 6 MG: KIT SUBCUTANEOUS at 11:45

## 2019-09-18 RX ADMIN — DOCETAXEL 120 MG: 20 INJECTION, SOLUTION, CONCENTRATE INTRAVENOUS at 10:14

## 2019-09-18 RX ADMIN — CYCLOPHOSPHAMIDE 940 MG: 1 INJECTION, POWDER, FOR SOLUTION INTRAVENOUS; ORAL at 11:20

## 2019-09-18 RX ADMIN — HEPARIN 500 UNITS: 100 SYRINGE at 11:54

## 2019-09-18 RX ADMIN — SODIUM CHLORIDE 250 ML: 9 INJECTION, SOLUTION INTRAVENOUS at 09:47

## 2019-09-18 NOTE — PROGRESS NOTES
"      PROBLEM LIST:  1. 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  ki67 30%.  Bilateral mastectomy on 19.   Pathology showed a 3.5 cm grade 2 metaplastic carcinoma, focally invading skeletal muscle.  0/1 SLN involved.  B) adjuvant chemotherapy with taxotere and cytoxan started on 19.  2. [id number] Invasive ductal carcinoma of the right breast, ER+ ND weakly +, Her2 negative   A) Bilateral mastectomy on 19.   Pathology showed a 8 mm intermediate grade IDC  2. Anxiety/depression  3. Hypertension  4. Hyperlipidemia                Subjective     CHIEF COMPLAINT: breast cancer    HISTORY OF PRESENT ILLNESS:   Cherelle Lara returns for follow-up.   She had one bad night about 5 days after her last chemotherapy.  She had vomiting and diarrhea that happened simultaneously.  When she took the Imodium and Zofran the symptoms did improve.  Otherwise she has noticed some cramping in her hands and swelling in her ankles.    She reports that her brother in [place] passed away from cancer on Monday.  This is her third sibling who has  of cancer and she has another sister who lives in [place] who has lung cancer.  This has been difficult for her.      Past Medical History, Past Surgical History, Social History, Family History have been reviewed and are without significant changes except as mentioned.    Review of Systems   A comprehensive 14 point review of systems was performed and was negative except as mentioned.    Medications:  The current medication list was reviewed in the EMR    ALLERGIES:    Allergies   Allergen Reactions   • Sulfa Antibiotics Anaphylaxis       Objective      BP (!) 183/86 Comment: RLE  Pulse 100   Temp 97.9 °F (36.6 °C) (Temporal)   Resp 18   Ht 152.4 cm (60\")   Wt 59 kg (130 lb) Comment: per pt  SpO2 98% Comment: RA  BMI 25.39 kg/m²    "   Performance Status: 0    General: well appearing female in no acute distress  Neuro: alert and oriented  HEENT: sclera anicteric, oropharynx clear  Lymphatics: no cervical, supraclavicular, or axillary adenopathy  Cardiovascular: regular rate and rhythm, no murmurs  Lungs: clear to auscultation bilaterally  Abdomen: soft, nontender, nondistended.  No palpable organomegaly  Extremeties: 1+ ankle edema bilaterally  Skin: no rashes, lesions, bruising, or petechiae  Psych: mood and affect appropriate      RECENT LABS:  WBC   Date Value Ref Range Status   08/28/2019 14.20 (H) 3.40 - 10.80 10*3/mm3 Final     RBC   Date Value Ref Range Status   08/28/2019 4.11 3.77 - 5.28 10*6/mm3 Final     Hemoglobin   Date Value Ref Range Status   08/28/2019 12.6 12.0 - 15.9 g/dL Final     Hematocrit   Date Value Ref Range Status   08/28/2019 38.3 34.0 - 46.6 % Final     MCV   Date Value Ref Range Status   08/28/2019 93.1 79.0 - 97.0 fL Final     MCH   Date Value Ref Range Status   08/28/2019 30.5 26.6 - 33.0 pg Final     MCHC   Date Value Ref Range Status   08/28/2019 32.8 31.5 - 35.7 g/dL Final     RDW   Date Value Ref Range Status   08/28/2019 15.4 12.3 - 15.4 % Final     RDW-SD   Date Value Ref Range Status   07/01/2019 49.5 37.0 - 54.0 fl Final     MPV   Date Value Ref Range Status   08/28/2019 8.0 6.0 - 12.0 fL Final     Platelets   Date Value Ref Range Status   08/28/2019 347 140 - 450 10*3/mm3 Final     Neutrophil %   Date Value Ref Range Status   08/28/2019 89.2 (H) 42.7 - 76.0 % Final     Lymphocyte %   Date Value Ref Range Status   08/28/2019 6.7 (L) 19.6 - 45.3 % Final     Monocyte %   Date Value Ref Range Status   08/28/2019 4.1 (L) 5.0 - 12.0 % Final     Neutrophils, Absolute   Date Value Ref Range Status   08/28/2019 12.70 (H) 1.70 - 7.00 10*3/mm3 Final     Lymphocytes, Absolute   Date Value Ref Range Status   08/28/2019 1.00 0.70 - 3.10 10*3/mm3 Final     Monocytes, Absolute   Date Value Ref Range Status   08/28/2019  0.60 0.10 - 0.90 10*3/mm3 Final     Lab Results   Component Value Date    GLUCOSE 143 (H) 08/28/2019    BUN 11 08/28/2019    CREATININE 0.79 08/28/2019    EGFRIFNONA 72 08/28/2019    BCR 13.9 08/28/2019    K 3.8 08/28/2019    CO2 25.0 08/28/2019    CALCIUM 9.3 08/28/2019    ALBUMIN 4.60 08/28/2019    AST 12 08/28/2019    ALT 9 08/28/2019             Assessment/Plan   Cherelle Lara is a 69 y.o. year old female with a pT2N0 ER and SD weakly positive, her2 negative metaplastic carcinoma of the left breast, as well as a T1N0 ER+ Her2 negaitve IDC of the right breast.    She will proceed with cycle 3 of adjuvant chemotherapy today with Taxotere and cyclophosphamide.     Nausea and vomiting: I recommended that she take Zofran twice a day for the first week after her chemotherapy dose to hopefully prevent any severe nausea and vomiting.    Diarrhea: Use Imodium as needed.    After completing chemotherapy she would be a candidate for adjuvant endocrine therapy with an aromatase inhibitor.  We discussed today that she will begin anastrozole about a month after completing chemotherapy.    Her son will accompany her at the next visit.  After that she may need a  to be scheduled along with her visits.  (Wol[place])    Follow-up in 3 weeks for cycle 4.  After that she will return to see me with labs about a month following her last dose.                  I spent 25 minutes with the patient. I spent > 50% percent of this time counseling and discussing prognosis, diagnostic testing, evaluation, current status and management.        Crystal Bee MD  [facility] Hematology and Oncology    9/18/2019          CC:

## 2019-10-09 ENCOUNTER — HOSPITAL ENCOUNTER (OUTPATIENT)
Dept: ONCOLOGY | Facility: HOSPITAL | Age: 69
Setting detail: INFUSION SERIES
Discharge: HOME | End: 2019-10-09

## 2019-10-09 ENCOUNTER — OFFICE VISIT (OUTPATIENT)
Dept: ONCOLOGY | Facility: CLINIC | Age: 69
End: 2019-10-09

## 2019-10-09 VITALS
BODY MASS INDEX: 26.5 KG/M2 | WEIGHT: 135 LBS | DIASTOLIC BLOOD PRESSURE: 80 MMHG | OXYGEN SATURATION: 96 % | HEART RATE: 92 BPM | HEIGHT: 60 IN | RESPIRATION RATE: 16 BRPM | TEMPERATURE: 98 F | SYSTOLIC BLOOD PRESSURE: 164 MMHG

## 2019-10-09 DIAGNOSIS — Z17.0 MALIGNANT NEOPLASM OF UPPER-OUTER QUADRANT OF LEFT BREAST IN FEMALE, ESTROGEN RECEPTOR POSITIVE (HCC): Primary | ICD-10-CM

## 2019-10-09 DIAGNOSIS — C50.412 MALIGNANT NEOPLASM OF UPPER-OUTER QUADRANT OF LEFT BREAST IN FEMALE, ESTROGEN RECEPTOR POSITIVE (HCC): Primary | ICD-10-CM

## 2019-10-09 DIAGNOSIS — Z45.2 FITTING AND ADJUSTMENT OF VASCULAR CATHETER: ICD-10-CM

## 2019-10-09 LAB
ALBUMIN SERPL-MCNC: 4 G/DL (ref 3.5–5.2)
ALBUMIN/GLOB SERPL: 1.8 G/DL
ALP SERPL-CCNC: 67 U/L (ref 39–117)
ALT SERPL W P-5'-P-CCNC: 6 U/L (ref 1–33)
ANION GAP SERPL CALCULATED.3IONS-SCNC: 10 MMOL/L (ref 5–15)
AST SERPL-CCNC: 10 U/L (ref 1–32)
BILIRUB SERPL-MCNC: 0.3 MG/DL (ref 0.2–1.2)
BUN BLD-MCNC: 8 MG/DL (ref 8–23)
BUN/CREAT SERPL: 9.4 (ref 7–25)
CALCIUM SPEC-SCNC: 9 MG/DL (ref 8.6–10.5)
CHLORIDE SERPL-SCNC: 108 MMOL/L (ref 98–107)
CO2 SERPL-SCNC: 26 MMOL/L (ref 22–29)
CREAT BLD-MCNC: 0.85 MG/DL (ref 0.57–1)
ERYTHROCYTE [DISTWIDTH] IN BLOOD BY AUTOMATED COUNT: 18.5 % (ref 12.3–15.4)
GFR SERPL CREATININE-BSD FRML MDRD: 66 ML/MIN/1.73
GLOBULIN UR ELPH-MCNC: 2.2 GM/DL
GLUCOSE BLD-MCNC: 133 MG/DL (ref 65–99)
HCT VFR BLD AUTO: 34.2 % (ref 34–46.6)
HGB BLD-MCNC: 11.2 G/DL (ref 12–15.9)
LYMPHOCYTES # BLD AUTO: 0.6 10*3/MM3 (ref 0.7–3.1)
LYMPHOCYTES NFR BLD AUTO: 7.8 % (ref 19.6–45.3)
MCH RBC QN AUTO: 31 PG (ref 26.6–33)
MCHC RBC AUTO-ENTMCNC: 32.7 G/DL (ref 31.5–35.7)
MCV RBC AUTO: 94.8 FL (ref 79–97)
MONOCYTES # BLD AUTO: 0.2 10*3/MM3 (ref 0.1–0.9)
MONOCYTES NFR BLD AUTO: 2.7 % (ref 5–12)
NEUTROPHILS # BLD AUTO: 7.2 10*3/MM3 (ref 1.7–7)
NEUTROPHILS NFR BLD AUTO: 89.5 % (ref 42.7–76)
PLATELET # BLD AUTO: 248 10*3/MM3 (ref 140–450)
PMV BLD AUTO: 8.1 FL (ref 6–12)
POTASSIUM BLD-SCNC: 4 MMOL/L (ref 3.5–5.2)
PROT SERPL-MCNC: 6.2 G/DL (ref 6–8.5)
RBC # BLD AUTO: 3.6 10*6/MM3 (ref 3.77–5.28)
SODIUM BLD-SCNC: 144 MMOL/L (ref 136–145)
WBC NRBC COR # BLD: 8.1 10*3/MM3 (ref 3.4–10.8)

## 2019-10-09 PROCEDURE — 25010000002 PALONOSETRON 0.25 MG/5ML SOLUTION PREFILLED SYRINGE: Performed by: INTERNAL MEDICINE

## 2019-10-09 PROCEDURE — 25010000002 PEGFILGRASTIM 6 MG/0.6ML PREFILLED SYRINGE KIT: Performed by: INTERNAL MEDICINE

## 2019-10-09 PROCEDURE — 25010000002 DOCETAXEL 20 MG/ML SOLUTION 4 ML VIAL: Performed by: INTERNAL MEDICINE

## 2019-10-09 PROCEDURE — 80053 COMPREHEN METABOLIC PANEL: CPT | Performed by: INTERNAL MEDICINE

## 2019-10-09 PROCEDURE — 25010000002 DOCETAXEL 20 MG/ML SOLUTION 1 ML VIAL: Performed by: INTERNAL MEDICINE

## 2019-10-09 PROCEDURE — 25010000002 CYCLOPHOSPHAMIDE PER 100 MG: Performed by: INTERNAL MEDICINE

## 2019-10-09 PROCEDURE — 96413 CHEMO IV INFUSION 1 HR: CPT

## 2019-10-09 PROCEDURE — 85025 COMPLETE CBC W/AUTO DIFF WBC: CPT | Performed by: INTERNAL MEDICINE

## 2019-10-09 PROCEDURE — 96375 TX/PRO/DX INJ NEW DRUG ADDON: CPT

## 2019-10-09 PROCEDURE — 96417 CHEMO IV INFUS EACH ADDL SEQ: CPT

## 2019-10-09 PROCEDURE — 99214 OFFICE O/P EST MOD 30 MIN: CPT | Performed by: INTERNAL MEDICINE

## 2019-10-09 PROCEDURE — 96377 APPLICATON ON-BODY INJECTOR: CPT

## 2019-10-09 RX ORDER — DIPHENHYDRAMINE HYDROCHLORIDE 50 MG/ML
50 INJECTION INTRAMUSCULAR; INTRAVENOUS AS NEEDED
Status: CANCELLED | OUTPATIENT
Start: 2019-10-09

## 2019-10-09 RX ORDER — PALONOSETRON 0.05 MG/ML
0.25 INJECTION, SOLUTION INTRAVENOUS ONCE
Status: CANCELLED | OUTPATIENT
Start: 2019-10-09

## 2019-10-09 RX ORDER — SODIUM CHLORIDE 9 MG/ML
250 INJECTION, SOLUTION INTRAVENOUS ONCE
Status: COMPLETED | OUTPATIENT
Start: 2019-10-09 | End: 2019-10-09

## 2019-10-09 RX ORDER — SODIUM CHLORIDE 0.9 % (FLUSH) 0.9 %
10 SYRINGE (ML) INJECTION AS NEEDED
OUTPATIENT
Start: 2019-10-09

## 2019-10-09 RX ORDER — HEPARIN SODIUM (PORCINE) LOCK FLUSH IV SOLN 100 UNIT/ML 100 UNIT/ML
300 SOLUTION INTRAVENOUS ONCE
OUTPATIENT
Start: 2019-10-09

## 2019-10-09 RX ORDER — FAMOTIDINE 10 MG/ML
20 INJECTION, SOLUTION INTRAVENOUS AS NEEDED
Status: CANCELLED | OUTPATIENT
Start: 2019-10-09

## 2019-10-09 RX ORDER — SODIUM CHLORIDE 9 MG/ML
250 INJECTION, SOLUTION INTRAVENOUS ONCE
Status: CANCELLED | OUTPATIENT
Start: 2019-10-09

## 2019-10-09 RX ORDER — HEPARIN SODIUM (PORCINE) LOCK FLUSH IV SOLN 100 UNIT/ML 100 UNIT/ML
500 SOLUTION INTRAVENOUS AS NEEDED
OUTPATIENT
Start: 2019-10-09

## 2019-10-09 RX ORDER — PALONOSETRON 0.05 MG/ML
0.25 INJECTION, SOLUTION INTRAVENOUS ONCE
Status: COMPLETED | OUTPATIENT
Start: 2019-10-09 | End: 2019-10-09

## 2019-10-09 RX ADMIN — SODIUM CHLORIDE 250 ML: 9 INJECTION, SOLUTION INTRAVENOUS at 11:00

## 2019-10-09 RX ADMIN — HEPARIN 500 UNITS: 100 SYRINGE at 12:51

## 2019-10-09 RX ADMIN — DOCETAXEL 120 MG: 20 INJECTION, SOLUTION, CONCENTRATE INTRAVENOUS at 11:12

## 2019-10-09 RX ADMIN — PALONOSETRON 0.25 MG: 0.25 INJECTION, SOLUTION INTRAVENOUS at 11:00

## 2019-10-09 RX ADMIN — CYCLOPHOSPHAMIDE 940 MG: 500 INJECTION, POWDER, FOR SOLUTION INTRAVENOUS; ORAL at 12:16

## 2019-10-09 RX ADMIN — PEGFILGRASTIM 6 MG: KIT SUBCUTANEOUS at 12:40

## 2019-10-09 NOTE — PROGRESS NOTES
"      PROBLEM LIST:  1. 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  ki67 30%.  Bilateral mastectomy on 7/2/19.   Pathology showed a 3.5 cm grade 2 metaplastic carcinoma, focally invading skeletal muscle.  0/1 SLN involved.  B) adjuvant chemotherapy with taxotere and cytoxan started on 8/7/19.  2. [id number] Invasive ductal carcinoma of the right breast, ER+ SD weakly +, Her2 negative   A) Bilateral mastectomy on 7/2/19.   Pathology showed a 8 mm intermediate grade IDC  2. Anxiety/depression  3. Hypertension  4. Hyperlipidemia                Subjective     CHIEF COMPLAINT: breast cancer    HISTORY OF PRESENT ILLNESS:   Cherelle Lara returns for follow-up.  She reports that the vomiting is not as bad with this last cycle.  However she has felt extremely tired and is looking forward to being finished with chemotherapy.  She developed a rash in her groin.  She saw her regular doctor and is using a antifungal powder on this.  It is improving.      Past Medical History, Past Surgical History, Social History, Family History have been reviewed and are without significant changes except as mentioned.    Review of Systems   A comprehensive 14 point review of systems was performed and was negative except as mentioned.    Medications:  The current medication list was reviewed in the EMR    ALLERGIES:    Allergies   Allergen Reactions   • Sulfa Antibiotics Anaphylaxis       Objective      /80 Comment: RLE  Pulse 92   Temp 98 °F (36.7 °C) (Temporal)   Resp 16   Ht 152.4 cm (60\")   Wt 61.2 kg (135 lb)   SpO2 96% Comment: RA  BMI 26.37 kg/m²      Performance Status: 0    General: well appearing female in no acute distress  Neuro: alert and oriented  HEENT: sclera anicteric, oropharynx clear  Lymphatics: no cervical, supraclavicular, or axillary adenopathy  Cardiovascular: " regular rate and rhythm, no murmurs  Lungs: clear to auscultation bilaterally  Abdomen: soft, nontender, nondistended.  No palpable organomegaly  Extremeties: 1+ ankle edema bilaterally  Skin: Resolving rash in the skin folds of the groin bilaterally.  Psych: mood and affect appropriate      RECENT LABS:  WBC   Date Value Ref Range Status   09/18/2019 12.10 (H) 3.40 - 10.80 10*3/mm3 Final     RBC   Date Value Ref Range Status   09/18/2019 3.61 (L) 3.77 - 5.28 10*6/mm3 Final     Hemoglobin   Date Value Ref Range Status   09/18/2019 11.3 (L) 12.0 - 15.9 g/dL Final     Hematocrit   Date Value Ref Range Status   09/18/2019 34.2 34.0 - 46.6 % Final     MCV   Date Value Ref Range Status   09/18/2019 94.6 79.0 - 97.0 fL Final     MCH   Date Value Ref Range Status   09/18/2019 31.3 26.6 - 33.0 pg Final     MCHC   Date Value Ref Range Status   09/18/2019 33.1 31.5 - 35.7 g/dL Final     RDW   Date Value Ref Range Status   09/18/2019 17.3 (H) 12.3 - 15.4 % Final     RDW-SD   Date Value Ref Range Status   07/01/2019 49.5 37.0 - 54.0 fl Final     MPV   Date Value Ref Range Status   09/18/2019 8.2 6.0 - 12.0 fL Final     Platelets   Date Value Ref Range Status   09/18/2019 236 140 - 450 10*3/mm3 Final     Neutrophil %   Date Value Ref Range Status   09/18/2019 87.9 (H) 42.7 - 76.0 % Final     Lymphocyte %   Date Value Ref Range Status   09/18/2019 7.6 (L) 19.6 - 45.3 % Final     Monocyte %   Date Value Ref Range Status   09/18/2019 4.5 (L) 5.0 - 12.0 % Final     Neutrophils, Absolute   Date Value Ref Range Status   09/18/2019 10.60 (H) 1.70 - 7.00 10*3/mm3 Final     Lymphocytes, Absolute   Date Value Ref Range Status   09/18/2019 0.90 0.70 - 3.10 10*3/mm3 Final     Monocytes, Absolute   Date Value Ref Range Status   09/18/2019 0.50 0.10 - 0.90 10*3/mm3 Final     Lab Results   Component Value Date    GLUCOSE 134 (H) 09/18/2019    BUN 12 09/18/2019    CREATININE 0.69 09/18/2019    EGFRIFNONA 84 09/18/2019    BCR 17.4 09/18/2019     K 3.8 09/18/2019    CO2 26.0 09/18/2019    CALCIUM 9.0 09/18/2019    ALBUMIN 4.50 09/18/2019    AST 10 09/18/2019    ALT 8 09/18/2019             Assessment/Plan   Cherelle Lara is a 69 y.o. year old female with a pT2N0 ER and MO weakly positive, her2 negative metaplastic carcinoma of the left breast, as well as a T1N0 ER+ Her2 negaitve IDC of the right breast.    She will proceed with cycle 4 of adjuvant chemotherapy today with Taxotere and cyclophosphamide.     Candidiasis of the skin folds of the groin: Continue nystatin powder.    Nausea and vomiting: Continue Zofran as needed.    Diarrhea: Use Imodium as needed.    She will follow-up in 1 month and we will plan to discuss anastrozole at that visit.    She will need a [place] Vietnamese  for her upcoming visits.    Follow-up in 1 month.                  I spent 25 minutes with the patient. I spent > 50% percent of this time counseling and discussing prognosis, diagnostic testing, evaluation, current status and management.        Crystal Bee MD  [facility] Hematology and Oncology    10/9/2019          CC:

## 2019-11-13 ENCOUNTER — LAB (OUTPATIENT)
Dept: LAB | Facility: HOSPITAL | Age: 69
End: 2019-11-13

## 2019-11-13 ENCOUNTER — APPOINTMENT (OUTPATIENT)
Dept: ONCOLOGY | Facility: HOSPITAL | Age: 69
End: 2019-11-13

## 2019-11-13 ENCOUNTER — OFFICE VISIT (OUTPATIENT)
Dept: ONCOLOGY | Facility: CLINIC | Age: 69
End: 2019-11-13

## 2019-11-13 VITALS
BODY MASS INDEX: 26.7 KG/M2 | HEIGHT: 60 IN | OXYGEN SATURATION: 98 % | WEIGHT: 136 LBS | SYSTOLIC BLOOD PRESSURE: 188 MMHG | TEMPERATURE: 97.8 F | DIASTOLIC BLOOD PRESSURE: 94 MMHG | HEART RATE: 82 BPM | RESPIRATION RATE: 18 BRPM

## 2019-11-13 DIAGNOSIS — Z17.0 MALIGNANT NEOPLASM OF UPPER-OUTER QUADRANT OF LEFT BREAST IN FEMALE, ESTROGEN RECEPTOR POSITIVE (HCC): ICD-10-CM

## 2019-11-13 DIAGNOSIS — Z17.0 MALIGNANT NEOPLASM OF UPPER-OUTER QUADRANT OF LEFT BREAST IN FEMALE, ESTROGEN RECEPTOR POSITIVE (HCC): Primary | ICD-10-CM

## 2019-11-13 DIAGNOSIS — C50.412 MALIGNANT NEOPLASM OF UPPER-OUTER QUADRANT OF LEFT BREAST IN FEMALE, ESTROGEN RECEPTOR POSITIVE (HCC): ICD-10-CM

## 2019-11-13 DIAGNOSIS — C50.412 MALIGNANT NEOPLASM OF UPPER-OUTER QUADRANT OF LEFT BREAST IN FEMALE, ESTROGEN RECEPTOR POSITIVE (HCC): Primary | ICD-10-CM

## 2019-11-13 LAB
ALBUMIN SERPL-MCNC: 4.1 G/DL (ref 3.5–5.2)
ALBUMIN/GLOB SERPL: 1.4 G/DL
ALP SERPL-CCNC: 64 U/L (ref 39–117)
ALT SERPL W P-5'-P-CCNC: 8 U/L (ref 1–33)
ANION GAP SERPL CALCULATED.3IONS-SCNC: 11 MMOL/L (ref 5–15)
AST SERPL-CCNC: 13 U/L (ref 1–32)
BILIRUB SERPL-MCNC: 0.4 MG/DL (ref 0.2–1.2)
BUN BLD-MCNC: 13 MG/DL (ref 8–23)
BUN/CREAT SERPL: 16.5 (ref 7–25)
CALCIUM SPEC-SCNC: 9.2 MG/DL (ref 8.6–10.5)
CHLORIDE SERPL-SCNC: 107 MMOL/L (ref 98–107)
CO2 SERPL-SCNC: 25 MMOL/L (ref 22–29)
CREAT BLD-MCNC: 0.79 MG/DL (ref 0.57–1)
ERYTHROCYTE [DISTWIDTH] IN BLOOD BY AUTOMATED COUNT: 18.4 % (ref 12.3–15.4)
GFR SERPL CREATININE-BSD FRML MDRD: 72 ML/MIN/1.73
GLOBULIN UR ELPH-MCNC: 3 GM/DL
GLUCOSE BLD-MCNC: 115 MG/DL (ref 65–99)
HCT VFR BLD AUTO: 39.4 % (ref 34–46.6)
HGB BLD-MCNC: 12.5 G/DL (ref 12–15.9)
LYMPHOCYTES # BLD AUTO: 1.9 10*3/MM3 (ref 0.7–3.1)
LYMPHOCYTES NFR BLD AUTO: 19.2 % (ref 19.6–45.3)
MCH RBC QN AUTO: 30.3 PG (ref 26.6–33)
MCHC RBC AUTO-ENTMCNC: 31.8 G/DL (ref 31.5–35.7)
MCV RBC AUTO: 95.2 FL (ref 79–97)
MONOCYTES # BLD AUTO: 0.4 10*3/MM3 (ref 0.1–0.9)
MONOCYTES NFR BLD AUTO: 4.4 % (ref 5–12)
NEUTROPHILS # BLD AUTO: 7.6 10*3/MM3 (ref 1.7–7)
NEUTROPHILS NFR BLD AUTO: 76.4 % (ref 42.7–76)
PLATELET # BLD AUTO: 230 10*3/MM3 (ref 140–450)
PMV BLD AUTO: 8.3 FL (ref 6–12)
POTASSIUM BLD-SCNC: 4 MMOL/L (ref 3.5–5.2)
PROT SERPL-MCNC: 7.1 G/DL (ref 6–8.5)
RBC # BLD AUTO: 4.14 10*6/MM3 (ref 3.77–5.28)
SODIUM BLD-SCNC: 143 MMOL/L (ref 136–145)
WBC NRBC COR # BLD: 9.9 10*3/MM3 (ref 3.4–10.8)

## 2019-11-13 PROCEDURE — 85025 COMPLETE CBC W/AUTO DIFF WBC: CPT

## 2019-11-13 PROCEDURE — 99214 OFFICE O/P EST MOD 30 MIN: CPT | Performed by: INTERNAL MEDICINE

## 2019-11-13 PROCEDURE — 36415 COLL VENOUS BLD VENIPUNCTURE: CPT

## 2019-11-13 PROCEDURE — 80053 COMPREHEN METABOLIC PANEL: CPT

## 2019-11-13 RX ORDER — ANASTROZOLE 1 MG/1
1 TABLET ORAL DAILY
Qty: 30 TABLET | Refills: 11 | Status: SHIPPED | OUTPATIENT
Start: 2019-11-13 | End: 2019-11-13

## 2019-11-13 RX ORDER — ANASTROZOLE 1 MG/1
1 TABLET ORAL DAILY
Qty: 90 TABLET | Refills: 3 | Status: SHIPPED | OUTPATIENT
Start: 2019-11-13

## 2019-11-13 NOTE — PROGRESS NOTES
"      PROBLEM LIST:  1. 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  ki67 30%.  Bilateral mastectomy on 7/2/19.   Pathology showed a 3.5 cm grade 2 metaplastic carcinoma, focally invading skeletal muscle.  0/1 SLN involved.  B) adjuvant chemotherapy with taxotere and cytoxan started on 8/7/19.   Completed 4 cycles  C) anastrozole started November 2019.  2. [id number] Invasive ductal carcinoma of the right breast, ER+ CT weakly +, Her2 negative   A) Bilateral mastectomy on 7/2/19.   Pathology showed a 8 mm intermediate grade IDC  2. Anxiety/depression  3. Hypertension  4. Hyperlipidemia                Subjective     CHIEF COMPLAINT: breast cancer    HISTORY OF PRESENT ILLNESS:   Cherelle Lara returns for follow-up.  She says she does not feel well.  Her legs are swollen and heavy.  She does have some numbness in her toes.  She feels like she is carrying around bowling balls in her feet.  She has some soreness of the chest from her surgery.  She reports she has recently started a new medicine for possible Parkinson's disease.    Past Medical History, Past Surgical History, Social History, Family History have been reviewed and are without significant changes except as mentioned.    Review of Systems   A comprehensive 14 point review of systems was performed and was negative except as mentioned.    Medications:  The current medication list was reviewed in the EMR    ALLERGIES:    Allergies   Allergen Reactions   • Sulfa Antibiotics Anaphylaxis       Objective      BP (!) 188/94 Comment: LLE  Pulse 82   Temp 97.8 °F (36.6 °C) (Temporal)   Resp 18   Ht 152.4 cm (60\")   Wt 61.7 kg (136 lb)   SpO2 98% Comment: RA  BMI 26.56 kg/m²      Performance Status: 0    General: well appearing female in no acute distress  Neuro: alert and oriented  HEENT: sclera anicteric, oropharynx " clear  Lymphatics: no cervical, supraclavicular, or axillary adenopathy  Cardiovascular: regular rate and rhythm, no murmurs  Lungs: clear to auscultation bilaterally  Abdomen: soft, nontender, nondistended.  No palpable organomegaly  Extremeties: 2+ lower extremity edema bilaterally  Skin: No rashes or lesions  Psych: mood and affect appropriate      RECENT LABS:  WBC   Date Value Ref Range Status   11/13/2019 9.90 3.40 - 10.80 10*3/mm3 Final     RBC   Date Value Ref Range Status   11/13/2019 4.14 3.77 - 5.28 10*6/mm3 Final     Hemoglobin   Date Value Ref Range Status   11/13/2019 12.5 12.0 - 15.9 g/dL Final     Hematocrit   Date Value Ref Range Status   11/13/2019 39.4 34.0 - 46.6 % Final     MCV   Date Value Ref Range Status   11/13/2019 95.2 79.0 - 97.0 fL Final     MCH   Date Value Ref Range Status   11/13/2019 30.3 26.6 - 33.0 pg Final     MCHC   Date Value Ref Range Status   11/13/2019 31.8 31.5 - 35.7 g/dL Final     RDW   Date Value Ref Range Status   11/13/2019 18.4 (H) 12.3 - 15.4 % Final     RDW-SD   Date Value Ref Range Status   07/01/2019 49.5 37.0 - 54.0 fl Final     MPV   Date Value Ref Range Status   11/13/2019 8.3 6.0 - 12.0 fL Final     Platelets   Date Value Ref Range Status   11/13/2019 230 140 - 450 10*3/mm3 Final     Neutrophil %   Date Value Ref Range Status   11/13/2019 76.4 (H) 42.7 - 76.0 % Final     Lymphocyte %   Date Value Ref Range Status   11/13/2019 19.2 (L) 19.6 - 45.3 % Final     Monocyte %   Date Value Ref Range Status   11/13/2019 4.4 (L) 5.0 - 12.0 % Final     Neutrophils, Absolute   Date Value Ref Range Status   11/13/2019 7.60 (H) 1.70 - 7.00 10*3/mm3 Final     Lymphocytes, Absolute   Date Value Ref Range Status   11/13/2019 1.90 0.70 - 3.10 10*3/mm3 Final     Monocytes, Absolute   Date Value Ref Range Status   11/13/2019 0.40 0.10 - 0.90 10*3/mm3 Final     Lab Results   Component Value Date    GLUCOSE 133 (H) 10/09/2019    BUN 8 10/09/2019    CREATININE 0.85 10/09/2019     EGFRIFNONA 66 10/09/2019    BCR 9.4 10/09/2019    K 4.0 10/09/2019    CO2 26.0 10/09/2019    CALCIUM 9.0 10/09/2019    ALBUMIN 4.00 10/09/2019    AST 10 10/09/2019    ALT 6 10/09/2019             Assessment/Plan   Cherelle Lara is a 69 y.o. year old female with a pT2N0 ER and [place] weakly positive, her2 negative metaplastic carcinoma of the left breast, as well as a T1N0 ER+ Her2 negaitve IDC of the right breast.    She has completed 4 cycles of chemotherapy.  We will plan to begin anastrozole.  We reviewed the potential side effects of anastrozole including hot flashes, vaginal dryness, joint pain, decrease in bone density, and mood or sleep disturbance.  She believes she may have had a bone density scan done in [place] and she will check her records at home.    We discussed that swelling can be a side effect of Taxotere, but this should resolve within a few months after stopping chemotherapy.  I suggested she wear some knee-high compression socks.     She is also having some peripheral neuropathy symptoms.  I am hopeful this will improve, but it can be a long-term side effect of chemotherapy.    She will need a [place]  for her upcoming visits.    Follow-up in 3 months.                  I spent 25 minutes with the patient. I spent > 50% percent of this time counseling and discussing prognosis, diagnostic testing, evaluation, current status and management.        Crystal Bee MD  [facility]ematology [facility] On[facility]    11/13/2019          CC:

## 2020-02-13 ENCOUNTER — LAB (OUTPATIENT)
Dept: LAB | Facility: HOSPITAL | Age: 70
End: 2020-02-13

## 2020-02-13 ENCOUNTER — OFFICE VISIT (OUTPATIENT)
Dept: ONCOLOGY | Facility: CLINIC | Age: 70
End: 2020-02-13

## 2020-02-13 VITALS
HEART RATE: 78 BPM | WEIGHT: 123 LBS | RESPIRATION RATE: 18 BRPM | SYSTOLIC BLOOD PRESSURE: 154 MMHG | OXYGEN SATURATION: 98 % | TEMPERATURE: 97.9 F | BODY MASS INDEX: 24.15 KG/M2 | HEIGHT: 60 IN | DIASTOLIC BLOOD PRESSURE: 77 MMHG

## 2020-02-13 DIAGNOSIS — C50.412 MALIGNANT NEOPLASM OF UPPER-OUTER QUADRANT OF LEFT BREAST IN FEMALE, ESTROGEN RECEPTOR POSITIVE (HCC): Primary | ICD-10-CM

## 2020-02-13 DIAGNOSIS — C50.412 MALIGNANT NEOPLASM OF UPPER-OUTER QUADRANT OF LEFT BREAST IN FEMALE, ESTROGEN RECEPTOR POSITIVE (HCC): ICD-10-CM

## 2020-02-13 DIAGNOSIS — Z17.0 MALIGNANT NEOPLASM OF UPPER-OUTER QUADRANT OF LEFT BREAST IN FEMALE, ESTROGEN RECEPTOR POSITIVE (HCC): ICD-10-CM

## 2020-02-13 DIAGNOSIS — Z17.0 MALIGNANT NEOPLASM OF UPPER-OUTER QUADRANT OF LEFT BREAST IN FEMALE, ESTROGEN RECEPTOR POSITIVE (HCC): Primary | ICD-10-CM

## 2020-02-13 LAB
ALBUMIN SERPL-MCNC: 4.4 G/DL (ref 3.5–5.2)
ALBUMIN/GLOB SERPL: 1.2 G/DL
ALP SERPL-CCNC: 80 U/L (ref 39–117)
ALT SERPL W P-5'-P-CCNC: 10 U/L (ref 1–33)
ANION GAP SERPL CALCULATED.3IONS-SCNC: 12 MMOL/L (ref 5–15)
AST SERPL-CCNC: 13 U/L (ref 1–32)
BILIRUB SERPL-MCNC: 0.4 MG/DL (ref 0.2–1.2)
BUN BLD-MCNC: 10 MG/DL (ref 8–23)
BUN/CREAT SERPL: 15.2 (ref 7–25)
CALCIUM SPEC-SCNC: 8.8 MG/DL (ref 8.6–10.5)
CHLORIDE SERPL-SCNC: 105 MMOL/L (ref 98–107)
CO2 SERPL-SCNC: 26 MMOL/L (ref 22–29)
CREAT BLD-MCNC: 0.66 MG/DL (ref 0.57–1)
ERYTHROCYTE [DISTWIDTH] IN BLOOD BY AUTOMATED COUNT: 18.4 % (ref 12.3–15.4)
GFR SERPL CREATININE-BSD FRML MDRD: 89 ML/MIN/1.73
GLOBULIN UR ELPH-MCNC: 3.6 GM/DL
GLUCOSE BLD-MCNC: 113 MG/DL (ref 65–99)
HCT VFR BLD AUTO: 44.4 % (ref 34–46.6)
HGB BLD-MCNC: 14.4 G/DL (ref 12–15.9)
LYMPHOCYTES # BLD AUTO: 2.2 10*3/MM3 (ref 0.7–3.1)
LYMPHOCYTES NFR BLD AUTO: 30.4 % (ref 19.6–45.3)
MCH RBC QN AUTO: 27.5 PG (ref 26.6–33)
MCHC RBC AUTO-ENTMCNC: 32.5 G/DL (ref 31.5–35.7)
MCV RBC AUTO: 84.6 FL (ref 79–97)
MONOCYTES # BLD AUTO: 0.3 10*3/MM3 (ref 0.1–0.9)
MONOCYTES NFR BLD AUTO: 4.6 % (ref 5–12)
NEUTROPHILS # BLD AUTO: 4.7 10*3/MM3 (ref 1.7–7)
NEUTROPHILS NFR BLD AUTO: 65 % (ref 42.7–76)
PLATELET # BLD AUTO: 176 10*3/MM3 (ref 140–450)
PMV BLD AUTO: 8.8 FL (ref 6–12)
POTASSIUM BLD-SCNC: 3.6 MMOL/L (ref 3.5–5.2)
PROT SERPL-MCNC: 8 G/DL (ref 6–8.5)
RBC # BLD AUTO: 5.24 10*6/MM3 (ref 3.77–5.28)
SODIUM BLD-SCNC: 143 MMOL/L (ref 136–145)
WBC NRBC COR # BLD: 7.2 10*3/MM3 (ref 3.4–10.8)

## 2020-02-13 PROCEDURE — 80053 COMPREHEN METABOLIC PANEL: CPT

## 2020-02-13 PROCEDURE — 99213 OFFICE O/P EST LOW 20 MIN: CPT | Performed by: NURSE PRACTITIONER

## 2020-02-13 PROCEDURE — 85025 COMPLETE CBC W/AUTO DIFF WBC: CPT

## 2020-02-13 PROCEDURE — 36415 COLL VENOUS BLD VENIPUNCTURE: CPT

## 2020-02-13 NOTE — PROGRESS NOTES
PROBLEM LIST:  1. 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  ki67 30%.  Bilateral mastectomy on 7/2/19.   Pathology showed a 3.5 cm grade 2 metaplastic carcinoma, focally invading skeletal muscle.  0/1 SLN involved.  B) adjuvant chemotherapy with taxotere and cytoxan started on 8/7/19.   Completed 4 cycles  C) anastrozole started November 2019.  2. [id number] Invasive ductal carcinoma of the right breast, ER+ [place] weakly +, Her2 negative   A) Bilateral mastectomy on 7/2/19.   Pathology showed a 8 mm intermediate grade IDC  2. Anxiety/depression  3. Hypertension  4. Hyperlipidemia            Subjective     CHIEF COMPLAINT: breast cancer    HISTORY OF PRESENT ILLNESS:   Cherelle Lara returns for follow-up.  She is tolerating anastrozole well.  She does complain of nail changes related to her chemotherapy treatment.  Still complains of some soreness of the chest from her surgery.      Past Medical History, Past Surgical History, Social History, Family History have been reviewed and are without significant changes except as mentioned.    Review of Systems   A comprehensive 14 point review of systems was performed and was negative except as mentioned.    Medications:  The current medication list was reviewed in the EMR    ALLERGIES:    Allergies   Allergen Reactions   • Sulfa Antibiotics Anaphylaxis       Objective      There were no vitals taken for this visit.     Performance Status: 0    General: well appearing female in no acute distress  Neuro: alert and oriented  HEENT: sclera anicteric, oropharynx clear  Lymphatics: no cervical, supraclavicular, or axillary adenopathy  Cardiovascular: regular rate and rhythm, no murmurs  Lungs: clear to auscultation bilaterally  Abdomen: soft, nontender, nondistended.  No palpable organomegaly  Extremeties: 2+ lower extremity edema  bilaterally  Skin: No rashes or lesions  Psych: mood and affect appropriate  Breast:  Mastectomy incisions healed, no lumps or nodules noted    Lab Results   Component Value Date    HGB 14.4 02/13/2020    HCT 44.4 02/13/2020    MCV 84.6 02/13/2020     02/13/2020    WBC 7.20 02/13/2020    NEUTROABS 4.70 02/13/2020    LYMPHSABS 2.20 02/13/2020    MONOSABS 0.30 02/13/2020     Lab Results   Component Value Date    GLUCOSE 113 (H) 02/13/2020    BUN 10 02/13/2020    CREATININE 0.66 02/13/2020     02/13/2020    K 3.6 02/13/2020     02/13/2020    CO2 26.0 02/13/2020    CALCIUM 8.8 02/13/2020    PROTEINTOT 8.0 02/13/2020    ALBUMIN 4.40 02/13/2020    BILITOT 0.4 02/13/2020    ALKPHOS 80 02/13/2020    AST 13 02/13/2020    ALT 10 02/13/2020       Assessment/Plan   Cherelle Lara is a 69 y.o. year old female with a pT2N0 ER and [place] weakly positive, her2 negative metaplastic carcinoma of the left breast, as well as a T1N0 ER+ Her2 negaitve IDC of the right breast.    She completed 4 cycles of chemotherapy.  She started anastrozole November 2019 and is tolerating well.  We reviewed the potential side effects of anastrozole including hot flashes, vaginal dryness, joint pain, decrease in bone density, and mood or sleep disturbance.  She believes she may have had a bone density scan done in [place] and she will check her records at home.      She will need a [place]  for her upcoming visits.    Follow-up in 3 months.      I spent 15 minutes with the patient. I spent > 50% percent of this time counseling and discussing prognosis, diagnostic testing, evaluation, current status and management.        Hallie Kat, APRN  [facility] Hematology and Oncology    2/13/2020

## 2023-03-16 NOTE — INTERVAL H&P NOTE
[facility] Pre-op    Full history and physical note from office is up to date.  See office note attached.    /84 (BP Location: Right arm, Patient Position: Sitting)   Pulse 82   Temp 98.1 °F (36.7 °C) (Temporal)   Resp 18   SpO2 96%   Breastfeeding? No     LAB Results:  Lab Results   Component Value Date    WBC 6.28 07/01/2019    HGB 14.5 07/01/2019    HCT 45.4 07/01/2019    MCV 92.7 07/01/2019     07/01/2019    GLUCOSE 213 (H) 07/01/2019    BUN 7 (L) 07/01/2019    CREATININE 0.81 07/01/2019    EGFRIFNONA 70 07/01/2019     07/01/2019    K 4.0 07/01/2019     07/01/2019    CO2 27.0 07/01/2019    CALCIUM 9.3 07/01/2019    ALBUMIN 4.60 07/01/2019    AST 14 07/01/2019    ALT 10 07/01/2019    BILITOT 0.4 07/01/2019     Cancer Staging (if applicable)  Cancer Patient: _x_ yes __no __unknown__N/A; If yes, clinical stage I T:_1_ N:_0_M:_0_, stage group I    A/P:  Stage I (T1 (1.8 cm)N0M0) invasive mammary carcinoma of left breast ER/MS + HER-2/stas - :  Bilateral mastectomies, left sentinel node biopsy    Toya Randolph, APRN 7/2/2019 8:42 AM      
water with medication @2321/clears

## (undated) DEVICE — DISH PETRI 3.5IN MD STRL LF

## (undated) DEVICE — CAMERA/LASER ARM DRAPE: Brand: DEROYAL

## (undated) DEVICE — PROXIMATE RH ROTATING HEAD SKIN STAPLERS (35 WIDE) CONTAINS 35 STAINLESS STEEL STAPLES: Brand: PROXIMATE

## (undated) DEVICE — COVER,LIGHT HANDLE,FLX,1/PK: Brand: MEDLINE INDUSTRIES, INC.

## (undated) DEVICE — GOWN,NON-REINFORCED,SIRUS,SET IN SLV,XL: Brand: MEDLINE

## (undated) DEVICE — SPNG LAP PREWSH SFTPK 18X18IN STRL PK/5

## (undated) DEVICE — GLV SURG TRIUMPH ORTHO W/ALOE PF LTX 7.5 STRL

## (undated) DEVICE — SUT SILK 3/0 TIES 18IN A184H

## (undated) DEVICE — LEX GENERAL BREAST: Brand: MEDLINE INDUSTRIES, INC.

## (undated) DEVICE — DRAIN JACKSON PRATT ROUND 15FR: Brand: CARDINAL HEALTH

## (undated) DEVICE — SUT SILK 2/0 PS 18IN 1588H

## (undated) DEVICE — ANTIBACTERIAL UNDYED BRAIDED (POLYGLACTIN 910), SYNTHETIC ABSORBABLE SUTURE: Brand: COATED VICRYL

## (undated) DEVICE — PREVENA DUO PEEL & PLACE SYSTEM KIT- 13 CM: Brand: PREVENA DUO™ PEEL & PLACE™

## (undated) DEVICE — JACKSON-PRATT 100CC BULB RESERVOIR: Brand: CARDINAL HEALTH